# Patient Record
Sex: FEMALE | Race: WHITE | Employment: STUDENT | URBAN - METROPOLITAN AREA
[De-identification: names, ages, dates, MRNs, and addresses within clinical notes are randomized per-mention and may not be internally consistent; named-entity substitution may affect disease eponyms.]

---

## 2023-11-29 ENCOUNTER — HOSPITAL ENCOUNTER (INPATIENT)
Age: 63
LOS: 14 days | Discharge: NURSING, REHAB OR LONG-TERM CARE | DRG: 175 | End: 2023-12-13
Attending: EMERGENCY MEDICINE | Admitting: INTERNAL MEDICINE
Payer: MEDICARE

## 2023-11-29 ENCOUNTER — APPOINTMENT (OUTPATIENT)
Dept: GENERAL RADIOLOGY | Age: 63
DRG: 175 | End: 2023-11-29
Payer: MEDICARE

## 2023-11-29 DIAGNOSIS — J96.01 ACUTE RESPIRATORY FAILURE WITH HYPOXIA (HCC): ICD-10-CM

## 2023-11-29 DIAGNOSIS — J18.9 PNEUMONIA DUE TO INFECTIOUS ORGANISM, UNSPECIFIED LATERALITY, UNSPECIFIED PART OF LUNG: Primary | ICD-10-CM

## 2023-11-29 PROBLEM — J16.0 CAP (COMMUNITY ACQUIRED PNEUMONIA) DUE TO CHLAMYDIA SPECIES: Status: ACTIVE | Noted: 2023-11-29

## 2023-11-29 LAB
ALBUMIN SERPL-MCNC: 3.8 G/DL (ref 3.4–5)
ALBUMIN/GLOB SERPL: 1.2 {RATIO} (ref 1.1–2.2)
ALP SERPL-CCNC: 72 U/L (ref 40–129)
ALT SERPL-CCNC: 22 U/L (ref 10–40)
ANION GAP SERPL CALCULATED.3IONS-SCNC: 9 MMOL/L (ref 3–16)
AST SERPL-CCNC: 22 U/L (ref 15–37)
BACTERIA URNS QL MICRO: ABNORMAL /HPF
BASOPHILS # BLD: 0 K/UL (ref 0–0.2)
BASOPHILS NFR BLD: 0.4 %
BILIRUB SERPL-MCNC: <0.2 MG/DL (ref 0–1)
BILIRUB UR QL STRIP.AUTO: NEGATIVE
BUN SERPL-MCNC: 18 MG/DL (ref 7–20)
CALCIUM SERPL-MCNC: 9.4 MG/DL (ref 8.3–10.6)
CHLORIDE SERPL-SCNC: 99 MMOL/L (ref 99–110)
CLARITY UR: ABNORMAL
CO2 SERPL-SCNC: 31 MMOL/L (ref 21–32)
COLOR UR: YELLOW
CREAT SERPL-MCNC: 0.8 MG/DL (ref 0.6–1.2)
DEPRECATED RDW RBC AUTO: 15.5 % (ref 12.4–15.4)
EOSINOPHIL # BLD: 0.2 K/UL (ref 0–0.6)
EOSINOPHIL NFR BLD: 2.2 %
EPI CELLS #/AREA URNS AUTO: 4 /HPF (ref 0–5)
FLUAV RNA RESP QL NAA+PROBE: NOT DETECTED
FLUBV RNA RESP QL NAA+PROBE: NOT DETECTED
GFR SERPLBLD CREATININE-BSD FMLA CKD-EPI: >60 ML/MIN/{1.73_M2}
GLUCOSE SERPL-MCNC: 116 MG/DL (ref 70–99)
GLUCOSE UR STRIP.AUTO-MCNC: NEGATIVE MG/DL
HCT VFR BLD AUTO: 37.9 % (ref 36–48)
HGB BLD-MCNC: 12.3 G/DL (ref 12–16)
HGB UR QL STRIP.AUTO: NEGATIVE
HYALINE CASTS #/AREA URNS AUTO: 0 /LPF (ref 0–8)
KETONES UR STRIP.AUTO-MCNC: ABNORMAL MG/DL
LACTATE BLDV-SCNC: 1.9 MMOL/L (ref 0.4–1.9)
LEUKOCYTE ESTERASE UR QL STRIP.AUTO: ABNORMAL
LYMPHOCYTES # BLD: 1.5 K/UL (ref 1–5.1)
LYMPHOCYTES NFR BLD: 14.9 %
MCH RBC QN AUTO: 29.3 PG (ref 26–34)
MCHC RBC AUTO-ENTMCNC: 32.5 G/DL (ref 31–36)
MCV RBC AUTO: 90.3 FL (ref 80–100)
MONOCYTES # BLD: 1.2 K/UL (ref 0–1.3)
MONOCYTES NFR BLD: 11.8 %
NEUTROPHILS # BLD: 7 K/UL (ref 1.7–7.7)
NEUTROPHILS NFR BLD: 70.7 %
NITRITE UR QL STRIP.AUTO: NEGATIVE
NT-PROBNP SERPL-MCNC: 133 PG/ML (ref 0–124)
PH UR STRIP.AUTO: 7.5 [PH] (ref 5–8)
PLATELET # BLD AUTO: 368 K/UL (ref 135–450)
PMV BLD AUTO: 7.7 FL (ref 5–10.5)
POTASSIUM SERPL-SCNC: 3.9 MMOL/L (ref 3.5–5.1)
PROCALCITONIN SERPL IA-MCNC: 0.08 NG/ML (ref 0–0.15)
PROT SERPL-MCNC: 7 G/DL (ref 6.4–8.2)
PROT UR STRIP.AUTO-MCNC: NEGATIVE MG/DL
RBC # BLD AUTO: 4.2 M/UL (ref 4–5.2)
RBC CLUMPS #/AREA URNS AUTO: 3 /HPF (ref 0–4)
SARS-COV-2 RNA RESP QL NAA+PROBE: NOT DETECTED
SODIUM SERPL-SCNC: 139 MMOL/L (ref 136–145)
SP GR UR STRIP.AUTO: 1.02 (ref 1–1.03)
TROPONIN, HIGH SENSITIVITY: 10 NG/L (ref 0–14)
UA COMPLETE W REFLEX CULTURE PNL UR: ABNORMAL
UA DIPSTICK W REFLEX MICRO PNL UR: YES
URN SPEC COLLECT METH UR: ABNORMAL
UROBILINOGEN UR STRIP-ACNC: 1 E.U./DL
VALPROATE SERPL-MCNC: 44.2 UG/ML (ref 50–100)
WBC # BLD AUTO: 9.9 K/UL (ref 4–11)
WBC #/AREA URNS AUTO: 7 /HPF (ref 0–5)

## 2023-11-29 PROCEDURE — 6370000000 HC RX 637 (ALT 250 FOR IP)

## 2023-11-29 PROCEDURE — 80164 ASSAY DIPROPYLACETIC ACD TOT: CPT

## 2023-11-29 PROCEDURE — 84484 ASSAY OF TROPONIN QUANT: CPT

## 2023-11-29 PROCEDURE — 6360000002 HC RX W HCPCS: Performed by: EMERGENCY MEDICINE

## 2023-11-29 PROCEDURE — 2580000003 HC RX 258: Performed by: NURSE PRACTITIONER

## 2023-11-29 PROCEDURE — 96365 THER/PROPH/DIAG IV INF INIT: CPT

## 2023-11-29 PROCEDURE — 71045 X-RAY EXAM CHEST 1 VIEW: CPT

## 2023-11-29 PROCEDURE — 93005 ELECTROCARDIOGRAM TRACING: CPT | Performed by: EMERGENCY MEDICINE

## 2023-11-29 PROCEDURE — 81001 URINALYSIS AUTO W/SCOPE: CPT

## 2023-11-29 PROCEDURE — 87040 BLOOD CULTURE FOR BACTERIA: CPT

## 2023-11-29 PROCEDURE — 2580000003 HC RX 258: Performed by: EMERGENCY MEDICINE

## 2023-11-29 PROCEDURE — 2700000000 HC OXYGEN THERAPY PER DAY

## 2023-11-29 PROCEDURE — 96375 TX/PRO/DX INJ NEW DRUG ADDON: CPT

## 2023-11-29 PROCEDURE — 1200000000 HC SEMI PRIVATE

## 2023-11-29 PROCEDURE — 6370000000 HC RX 637 (ALT 250 FOR IP): Performed by: EMERGENCY MEDICINE

## 2023-11-29 PROCEDURE — 94640 AIRWAY INHALATION TREATMENT: CPT

## 2023-11-29 PROCEDURE — 83880 ASSAY OF NATRIURETIC PEPTIDE: CPT

## 2023-11-29 PROCEDURE — 84145 PROCALCITONIN (PCT): CPT

## 2023-11-29 PROCEDURE — 83605 ASSAY OF LACTIC ACID: CPT

## 2023-11-29 PROCEDURE — 6370000000 HC RX 637 (ALT 250 FOR IP): Performed by: NURSE PRACTITIONER

## 2023-11-29 PROCEDURE — 36415 COLL VENOUS BLD VENIPUNCTURE: CPT

## 2023-11-29 PROCEDURE — 87636 SARSCOV2 & INF A&B AMP PRB: CPT

## 2023-11-29 PROCEDURE — 85025 COMPLETE CBC W/AUTO DIFF WBC: CPT

## 2023-11-29 PROCEDURE — 99285 EMERGENCY DEPT VISIT HI MDM: CPT

## 2023-11-29 PROCEDURE — 80053 COMPREHEN METABOLIC PANEL: CPT

## 2023-11-29 RX ORDER — DOXYCYCLINE HYCLATE 100 MG
100 TABLET ORAL ONCE
Status: DISCONTINUED | OUTPATIENT
Start: 2023-11-29 | End: 2023-11-29

## 2023-11-29 RX ORDER — KETOROLAC TROMETHAMINE 15 MG/ML
15 INJECTION, SOLUTION INTRAMUSCULAR; INTRAVENOUS ONCE
Status: COMPLETED | OUTPATIENT
Start: 2023-11-29 | End: 2023-11-29

## 2023-11-29 RX ORDER — AZITHROMYCIN 250 MG/1
500 TABLET, FILM COATED ORAL EVERY 24 HOURS
Status: COMPLETED | OUTPATIENT
Start: 2023-11-30 | End: 2023-12-02

## 2023-11-29 RX ORDER — IPRATROPIUM BROMIDE AND ALBUTEROL SULFATE 2.5; .5 MG/3ML; MG/3ML
1 SOLUTION RESPIRATORY (INHALATION) ONCE
Status: COMPLETED | OUTPATIENT
Start: 2023-11-29 | End: 2023-11-29

## 2023-11-29 RX ORDER — OXYBUTYNIN CHLORIDE 5 MG/1
5 TABLET ORAL DAILY
Status: DISCONTINUED | OUTPATIENT
Start: 2023-11-29 | End: 2023-12-13 | Stop reason: HOSPADM

## 2023-11-29 RX ORDER — PRAVASTATIN SODIUM 40 MG
40 TABLET ORAL DAILY
Status: DISCONTINUED | OUTPATIENT
Start: 2023-11-29 | End: 2023-12-13 | Stop reason: HOSPADM

## 2023-11-29 RX ORDER — LEVOTHYROXINE SODIUM 88 UG/1
88 TABLET ORAL DAILY
Status: DISCONTINUED | OUTPATIENT
Start: 2023-11-29 | End: 2023-11-30

## 2023-11-29 RX ORDER — LAMOTRIGINE 100 MG/1
100 TABLET ORAL 2 TIMES DAILY
Status: DISCONTINUED | OUTPATIENT
Start: 2023-11-29 | End: 2023-12-13 | Stop reason: HOSPADM

## 2023-11-29 RX ORDER — DEXAMETHASONE SODIUM PHOSPHATE 4 MG/ML
8 INJECTION, SOLUTION INTRA-ARTICULAR; INTRALESIONAL; INTRAMUSCULAR; INTRAVENOUS; SOFT TISSUE ONCE
Status: COMPLETED | OUTPATIENT
Start: 2023-11-29 | End: 2023-11-29

## 2023-11-29 RX ORDER — SODIUM CHLORIDE 9 MG/ML
INJECTION, SOLUTION INTRAVENOUS PRN
Status: DISCONTINUED | OUTPATIENT
Start: 2023-11-29 | End: 2023-12-13 | Stop reason: HOSPADM

## 2023-11-29 RX ORDER — HYDROCHLOROTHIAZIDE 25 MG/1
25 TABLET ORAL DAILY
Status: ON HOLD | COMMUNITY
End: 2023-12-04 | Stop reason: HOSPADM

## 2023-11-29 RX ORDER — GUAIFENESIN 600 MG/1
600 TABLET, EXTENDED RELEASE ORAL 2 TIMES DAILY
Status: DISCONTINUED | OUTPATIENT
Start: 2023-11-29 | End: 2023-12-07

## 2023-11-29 RX ORDER — CEFDINIR 300 MG/1
300 CAPSULE ORAL ONCE
Status: DISCONTINUED | OUTPATIENT
Start: 2023-11-29 | End: 2023-11-29

## 2023-11-29 RX ORDER — DIVALPROEX SODIUM 250 MG/1
500 TABLET, DELAYED RELEASE ORAL 2 TIMES DAILY
Status: DISCONTINUED | OUTPATIENT
Start: 2023-11-29 | End: 2023-12-13 | Stop reason: HOSPADM

## 2023-11-29 RX ORDER — ONDANSETRON 2 MG/ML
4 INJECTION INTRAMUSCULAR; INTRAVENOUS EVERY 6 HOURS PRN
Status: DISCONTINUED | OUTPATIENT
Start: 2023-11-29 | End: 2023-12-13 | Stop reason: HOSPADM

## 2023-11-29 RX ORDER — IPRATROPIUM BROMIDE AND ALBUTEROL SULFATE 2.5; .5 MG/3ML; MG/3ML
SOLUTION RESPIRATORY (INHALATION)
Status: COMPLETED
Start: 2023-11-29 | End: 2023-11-29

## 2023-11-29 RX ORDER — ACETAMINOPHEN 325 MG/1
650 TABLET ORAL EVERY 6 HOURS PRN
Status: DISCONTINUED | OUTPATIENT
Start: 2023-11-29 | End: 2023-12-13 | Stop reason: HOSPADM

## 2023-11-29 RX ORDER — ALBUTEROL SULFATE 2.5 MG/3ML
2.5 SOLUTION RESPIRATORY (INHALATION)
Status: DISCONTINUED | OUTPATIENT
Start: 2023-11-29 | End: 2023-11-30

## 2023-11-29 RX ORDER — ACETAMINOPHEN 650 MG/1
650 SUPPOSITORY RECTAL EVERY 6 HOURS PRN
Status: DISCONTINUED | OUTPATIENT
Start: 2023-11-29 | End: 2023-12-13 | Stop reason: HOSPADM

## 2023-11-29 RX ORDER — ONDANSETRON 4 MG/1
4 TABLET, ORALLY DISINTEGRATING ORAL EVERY 8 HOURS PRN
Status: DISCONTINUED | OUTPATIENT
Start: 2023-11-29 | End: 2023-12-13 | Stop reason: HOSPADM

## 2023-11-29 RX ORDER — ACETAMINOPHEN 500 MG
1000 TABLET ORAL ONCE
Status: COMPLETED | OUTPATIENT
Start: 2023-11-29 | End: 2023-11-29

## 2023-11-29 RX ORDER — POLYETHYLENE GLYCOL 3350 17 G/17G
17 POWDER, FOR SOLUTION ORAL DAILY PRN
Status: DISCONTINUED | OUTPATIENT
Start: 2023-11-29 | End: 2023-12-13 | Stop reason: HOSPADM

## 2023-11-29 RX ORDER — DIVALPROEX SODIUM 250 MG/1
500 TABLET, DELAYED RELEASE ORAL NIGHTLY
Status: DISCONTINUED | OUTPATIENT
Start: 2023-11-29 | End: 2023-11-29

## 2023-11-29 RX ORDER — SODIUM CHLORIDE 0.9 % (FLUSH) 0.9 %
5-40 SYRINGE (ML) INJECTION PRN
Status: DISCONTINUED | OUTPATIENT
Start: 2023-11-29 | End: 2023-12-13 | Stop reason: HOSPADM

## 2023-11-29 RX ORDER — CALCIUM CARBONATE 500(1250)
500 TABLET ORAL DAILY
Status: DISCONTINUED | OUTPATIENT
Start: 2023-11-29 | End: 2023-12-13 | Stop reason: HOSPADM

## 2023-11-29 RX ORDER — LANOLIN ALCOHOL/MO/W.PET/CERES
3 CREAM (GRAM) TOPICAL NIGHTLY PRN
Status: DISCONTINUED | OUTPATIENT
Start: 2023-11-29 | End: 2023-12-13 | Stop reason: HOSPADM

## 2023-11-29 RX ORDER — SODIUM CHLORIDE, SODIUM LACTATE, POTASSIUM CHLORIDE, CALCIUM CHLORIDE 600; 310; 30; 20 MG/100ML; MG/100ML; MG/100ML; MG/100ML
INJECTION, SOLUTION INTRAVENOUS CONTINUOUS
Status: DISCONTINUED | OUTPATIENT
Start: 2023-11-29 | End: 2023-12-02

## 2023-11-29 RX ORDER — ENOXAPARIN SODIUM 100 MG/ML
40 INJECTION SUBCUTANEOUS DAILY
Status: DISCONTINUED | OUTPATIENT
Start: 2023-11-30 | End: 2023-12-05

## 2023-11-29 RX ORDER — SODIUM CHLORIDE 0.9 % (FLUSH) 0.9 %
5-40 SYRINGE (ML) INJECTION EVERY 12 HOURS SCHEDULED
Status: DISCONTINUED | OUTPATIENT
Start: 2023-11-29 | End: 2023-12-13 | Stop reason: HOSPADM

## 2023-11-29 RX ADMIN — DIVALPROEX SODIUM 500 MG: 250 TABLET, DELAYED RELEASE ORAL at 23:35

## 2023-11-29 RX ADMIN — IPRATROPIUM BROMIDE AND ALBUTEROL SULFATE 1 DOSE: .5; 3 SOLUTION RESPIRATORY (INHALATION) at 19:31

## 2023-11-29 RX ADMIN — LAMOTRIGINE 100 MG: 100 TABLET ORAL at 23:32

## 2023-11-29 RX ADMIN — CEFTRIAXONE SODIUM 2000 MG: 2 INJECTION, POWDER, FOR SOLUTION INTRAMUSCULAR; INTRAVENOUS at 20:13

## 2023-11-29 RX ADMIN — GUAIFENESIN 600 MG: 600 TABLET, EXTENDED RELEASE ORAL at 23:31

## 2023-11-29 RX ADMIN — DEXAMETHASONE SODIUM PHOSPHATE 8 MG: 4 INJECTION, SOLUTION INTRAMUSCULAR; INTRAVENOUS at 19:56

## 2023-11-29 RX ADMIN — ACETAMINOPHEN 1000 MG: 500 TABLET ORAL at 18:11

## 2023-11-29 RX ADMIN — SODIUM CHLORIDE, POTASSIUM CHLORIDE, SODIUM LACTATE AND CALCIUM CHLORIDE: 600; 310; 30; 20 INJECTION, SOLUTION INTRAVENOUS at 23:41

## 2023-11-29 RX ADMIN — KETOROLAC TROMETHAMINE 15 MG: 15 INJECTION, SOLUTION INTRAMUSCULAR; INTRAVENOUS at 18:54

## 2023-11-29 RX ADMIN — AZITHROMYCIN MONOHYDRATE 500 MG: 500 INJECTION, POWDER, LYOPHILIZED, FOR SOLUTION INTRAVENOUS at 20:52

## 2023-11-29 RX ADMIN — Medication 10 ML: at 23:41

## 2023-11-29 RX ADMIN — IPRATROPIUM BROMIDE AND ALBUTEROL SULFATE 1 DOSE: 2.5; .5 SOLUTION RESPIRATORY (INHALATION) at 19:31

## 2023-11-29 ASSESSMENT — PAIN DESCRIPTION - PAIN TYPE: TYPE: ACUTE PAIN

## 2023-11-29 ASSESSMENT — PAIN - FUNCTIONAL ASSESSMENT: PAIN_FUNCTIONAL_ASSESSMENT: ACTIVITIES ARE NOT PREVENTED

## 2023-11-29 ASSESSMENT — PAIN DESCRIPTION - ORIENTATION: ORIENTATION: LEFT

## 2023-11-29 ASSESSMENT — PAIN SCALES - GENERAL
PAINLEVEL_OUTOF10: 8
PAINLEVEL_OUTOF10: 0
PAINLEVEL_OUTOF10: 5

## 2023-11-29 ASSESSMENT — PAIN DESCRIPTION - DESCRIPTORS: DESCRIPTORS: CRUSHING

## 2023-11-29 ASSESSMENT — PAIN DESCRIPTION - LOCATION: LOCATION: ARM

## 2023-11-29 NOTE — ED NOTES
Spoke with Jose Bahena at [facility]. States pt's symptoms began Sunday and have worsened. Complained to staff she could not breath through her throat. Pt has been taking dayquil.      Jose Bahena will arrange  if needed     Arely Cyr RN  11/29/23 4188

## 2023-11-30 LAB
ALBUMIN SERPL-MCNC: 3.5 G/DL (ref 3.4–5)
ALBUMIN/GLOB SERPL: 1.1 {RATIO} (ref 1.1–2.2)
ALP SERPL-CCNC: 64 U/L (ref 40–129)
ALT SERPL-CCNC: 17 U/L (ref 10–40)
ANION GAP SERPL CALCULATED.3IONS-SCNC: 10 MMOL/L (ref 3–16)
AST SERPL-CCNC: 18 U/L (ref 15–37)
BASOPHILS # BLD: 0 K/UL (ref 0–0.2)
BASOPHILS NFR BLD: 0.5 %
BILIRUB SERPL-MCNC: 0.3 MG/DL (ref 0–1)
BUN SERPL-MCNC: 14 MG/DL (ref 7–20)
CALCIUM SERPL-MCNC: 9.1 MG/DL (ref 8.3–10.6)
CHLORIDE SERPL-SCNC: 100 MMOL/L (ref 99–110)
CO2 SERPL-SCNC: 28 MMOL/L (ref 21–32)
CREAT SERPL-MCNC: 0.6 MG/DL (ref 0.6–1.2)
DEPRECATED RDW RBC AUTO: 15.3 % (ref 12.4–15.4)
EKG ATRIAL RATE: 78 BPM
EKG DIAGNOSIS: NORMAL
EKG P AXIS: 53 DEGREES
EKG P-R INTERVAL: 162 MS
EKG Q-T INTERVAL: 388 MS
EKG QRS DURATION: 86 MS
EKG QTC CALCULATION (BAZETT): 442 MS
EKG R AXIS: -16 DEGREES
EKG T AXIS: 22 DEGREES
EKG VENTRICULAR RATE: 78 BPM
EOSINOPHIL # BLD: 0 K/UL (ref 0–0.6)
EOSINOPHIL NFR BLD: 0.1 %
GFR SERPLBLD CREATININE-BSD FMLA CKD-EPI: >60 ML/MIN/{1.73_M2}
GLUCOSE SERPL-MCNC: 136 MG/DL (ref 70–99)
HCT VFR BLD AUTO: 39.1 % (ref 36–48)
HGB BLD-MCNC: 12.7 G/DL (ref 12–16)
LYMPHOCYTES # BLD: 1.2 K/UL (ref 1–5.1)
LYMPHOCYTES NFR BLD: 12 %
MCH RBC QN AUTO: 29.5 PG (ref 26–34)
MCHC RBC AUTO-ENTMCNC: 32.4 G/DL (ref 31–36)
MCV RBC AUTO: 90.9 FL (ref 80–100)
MONOCYTES # BLD: 0.4 K/UL (ref 0–1.3)
MONOCYTES NFR BLD: 4.3 %
NEUTROPHILS # BLD: 8.1 K/UL (ref 1.7–7.7)
NEUTROPHILS NFR BLD: 83.1 %
PLATELET # BLD AUTO: 391 K/UL (ref 135–450)
PMV BLD AUTO: 7.9 FL (ref 5–10.5)
POTASSIUM SERPL-SCNC: 4.2 MMOL/L (ref 3.5–5.1)
PROT SERPL-MCNC: 6.7 G/DL (ref 6.4–8.2)
RBC # BLD AUTO: 4.3 M/UL (ref 4–5.2)
REPORT: NORMAL
RESP PATH DNA+RNA PNL NPH NAA+NON-PROBE: NORMAL
SODIUM SERPL-SCNC: 138 MMOL/L (ref 136–145)
WBC # BLD AUTO: 9.8 K/UL (ref 4–11)

## 2023-11-30 PROCEDURE — 85025 COMPLETE CBC W/AUTO DIFF WBC: CPT

## 2023-11-30 PROCEDURE — 87449 NOS EACH ORGANISM AG IA: CPT

## 2023-11-30 PROCEDURE — 6370000000 HC RX 637 (ALT 250 FOR IP): Performed by: HOSPITALIST

## 2023-11-30 PROCEDURE — 6370000000 HC RX 637 (ALT 250 FOR IP): Performed by: NURSE PRACTITIONER

## 2023-11-30 PROCEDURE — 80053 COMPREHEN METABOLIC PANEL: CPT

## 2023-11-30 PROCEDURE — 2580000003 HC RX 258: Performed by: NURSE PRACTITIONER

## 2023-11-30 PROCEDURE — 6360000002 HC RX W HCPCS: Performed by: NURSE PRACTITIONER

## 2023-11-30 PROCEDURE — 6360000002 HC RX W HCPCS: Performed by: HOSPITALIST

## 2023-11-30 PROCEDURE — 2700000000 HC OXYGEN THERAPY PER DAY

## 2023-11-30 PROCEDURE — 94761 N-INVAS EAR/PLS OXIMETRY MLT: CPT

## 2023-11-30 PROCEDURE — 94640 AIRWAY INHALATION TREATMENT: CPT

## 2023-11-30 PROCEDURE — 0202U NFCT DS 22 TRGT SARS-COV-2: CPT

## 2023-11-30 PROCEDURE — 1200000000 HC SEMI PRIVATE

## 2023-11-30 PROCEDURE — 93010 ELECTROCARDIOGRAM REPORT: CPT | Performed by: INTERNAL MEDICINE

## 2023-11-30 PROCEDURE — 36415 COLL VENOUS BLD VENIPUNCTURE: CPT

## 2023-11-30 RX ORDER — ALBUTEROL SULFATE 2.5 MG/3ML
2.5 SOLUTION RESPIRATORY (INHALATION)
Status: DISCONTINUED | OUTPATIENT
Start: 2023-11-30 | End: 2023-11-30

## 2023-11-30 RX ORDER — ALBUTEROL SULFATE 2.5 MG/3ML
2.5 SOLUTION RESPIRATORY (INHALATION) EVERY 4 HOURS PRN
Status: DISCONTINUED | OUTPATIENT
Start: 2023-11-30 | End: 2023-12-05

## 2023-11-30 RX ORDER — LEVOTHYROXINE SODIUM 88 UG/1
88 TABLET ORAL DAILY
Status: DISCONTINUED | OUTPATIENT
Start: 2023-11-30 | End: 2023-12-13 | Stop reason: HOSPADM

## 2023-11-30 RX ORDER — ALBUTEROL SULFATE 2.5 MG/3ML
2.5 SOLUTION RESPIRATORY (INHALATION)
Status: DISCONTINUED | OUTPATIENT
Start: 2023-11-30 | End: 2023-12-04

## 2023-11-30 RX ADMIN — DIVALPROEX SODIUM 500 MG: 250 TABLET, DELAYED RELEASE ORAL at 10:21

## 2023-11-30 RX ADMIN — CALCIUM 500 MG: 500 TABLET ORAL at 10:15

## 2023-11-30 RX ADMIN — LEVOTHYROXINE SODIUM 88 MCG: 0.09 TABLET ORAL at 10:00

## 2023-11-30 RX ADMIN — ALBUTEROL SULFATE 2.5 MG: 2.5 SOLUTION RESPIRATORY (INHALATION) at 22:17

## 2023-11-30 RX ADMIN — PRAVASTATIN SODIUM 40 MG: 40 TABLET ORAL at 10:18

## 2023-11-30 RX ADMIN — ALBUTEROL SULFATE 2.5 MG: 2.5 SOLUTION RESPIRATORY (INHALATION) at 09:44

## 2023-11-30 RX ADMIN — AZITHROMYCIN DIHYDRATE 500 MG: 250 TABLET, FILM COATED ORAL at 20:17

## 2023-11-30 RX ADMIN — ENOXAPARIN SODIUM 40 MG: 100 INJECTION SUBCUTANEOUS at 08:22

## 2023-11-30 RX ADMIN — GUAIFENESIN 600 MG: 600 TABLET, EXTENDED RELEASE ORAL at 20:17

## 2023-11-30 RX ADMIN — CEFTRIAXONE SODIUM 1000 MG: 1 INJECTION, POWDER, FOR SOLUTION INTRAMUSCULAR; INTRAVENOUS at 20:20

## 2023-11-30 RX ADMIN — LAMOTRIGINE 100 MG: 100 TABLET ORAL at 10:12

## 2023-11-30 RX ADMIN — GUAIFENESIN 600 MG: 600 TABLET, EXTENDED RELEASE ORAL at 08:22

## 2023-11-30 RX ADMIN — DIVALPROEX SODIUM 500 MG: 250 TABLET, DELAYED RELEASE ORAL at 20:17

## 2023-11-30 RX ADMIN — OXYBUTYNIN CHLORIDE 5 MG: 5 TABLET ORAL at 10:19

## 2023-11-30 RX ADMIN — LAMOTRIGINE 100 MG: 100 TABLET ORAL at 20:17

## 2023-11-30 ASSESSMENT — PAIN SCALES - GENERAL: PAINLEVEL_OUTOF10: 0

## 2023-11-30 NOTE — PROGRESS NOTES
Admission assessment completed, pts alert and oriented, pts is alert but confused, forgetful, unable to make her own decision, vitals are stable at this time, afebrile, peripheral Iv on her right forearm, auscultate lungs noted wheezes and crackles anteriorly and diminished lungs sounds on the bases, oxygen sat on 2L nasal canula, all her medications are administered as per [facility], incontinent care completed, left patient lying in her bed, safety measures completed, bed in lowest position, call light in reach, bed locked, alarm on the bed.   Slime [facility] RN

## 2023-11-30 NOTE — ED NOTES
ED TO INPATIENT SBAR HANDOFF    Patient Name: Marlene Garrison   :  1960  66 y.o. MRN:  [id number]  Preferred Name    ED Room #:  ED-0007/07  Family/Caregiver Present no   Restraints no   Sitter no   Sepsis Risk Score Sepsis Risk Score: 2.35    Situation  Code Status: No Order . Allergies: Patient has no known allergies. Weight: Patient Vitals for the past 96 hrs (Last 3 readings):   Weight   23 1833 92.1 kg (203 lb)     Arrived from: nursing home  Chief Complaint:   Chief Complaint   Patient presents with    Cough     PT to ED via [address] [facility] from home c/o cough. PT is MRDD. History obtained with EMS/Caregiver help. Pt temp 101.2. PT has been getting nyquil and dayquil for symptoms. Symptoms began x6 days. Fever    POPLAR BLUFF [facility] Problem/Diagnosis:  Principal Problem:    CAP (community acquired pneumonia) due to Chlamydia species  Resolved Problems:    * No resolved hospital problems. *    Imaging:   XR CHEST PORTABLE   Final Result   Mild cardiomegaly and mild central pulmonary congestion      Overlying EKG lead artifact with a questionable early infiltrate vs atypical   pulmonary edema along the left perihilar region and extending into the lung   base.            Abnormal labs:   Abnormal Labs Reviewed   CBC WITH AUTO DIFFERENTIAL - Abnormal; Notable for the following components:       Result Value    RDW 15.5 (*)     All other components within normal limits   COMPREHENSIVE METABOLIC PANEL W/ REFLEX TO MG FOR LOW K - Abnormal; Notable for the following components:    Glucose 116 (*)     All other components within normal limits   URINALYSIS WITH REFLEX TO CULTURE - Abnormal; Notable for the following components:    Clarity, UA CLOUDY (*)     Ketones, Urine TRACE (*)     Leukocyte Esterase, Urine SMALL (*)     All other components within normal limits   VALPROIC ACID LEVEL, TOTAL - Abnormal; Notable for the following components:    Valproic Acid Lvl 44.2 (*)     All other components signed by Milena Vila RN on 11/29/2023 at 9:26 PM       Milena Vila, [address]  11/29/23 1455

## 2023-11-30 NOTE — PROGRESS NOTES
Pharmacy Home Medication Reconciliation Note    A medication reconciliation has been completed for Daisy Gibson 1960    Pharmacy: [facility] [address] [facility]. [address] [place] [address]    Information provided by: Patient    The patient's home medication list is as follows: No current facility-administered medications on file prior to encounter. Current Outpatient Medications on File Prior to Encounter   Medication Sig Dispense Refill    hydroCHLOROthiazide (HYDRODIURIL) 25 MG tablet Take 1 tablet by mouth daily      Multiple Vitamins-Minerals (CERTAVITE/ANTIOXIDANTS PO) Take by mouth      divalproex (DEPAKOTE) 500 MG DR tablet Take 1 tablet by mouth daily      divalproex (DEPAKOTE) 250 MG DR tablet Take 1 tablet by mouth daily      lamoTRIgine (LAMICTAL) 100 MG tablet Take 1 tablet by mouth 2 times daily      levothyroxine (SYNTHROID) 100 MCG tablet Take 1 tablet by mouth Daily      oxybutynin (DITROPAN) 5 MG tablet Take 1 tablet by mouth daily      calcium carbonate (OSCAL) 500 MG TABS tablet Take 1 tablet by mouth daily      pravastatin (PRAVACHOL) 40 MG tablet Take 1 tablet by mouth daily       Of note, Patient takes Divalproex 250mg DR tab and Divalproex 500mg DR tab once daily for a total dose of 750mg; Patient states most recent dose was the evening of 11/28    Of note, Patient takes Lamictal 100mg tab BID; Patient states most recent dose was evening of 11/28    Timing of last doses updated.     Thank you,  Nevaeh Lebron CPhT

## 2023-11-30 NOTE — PROGRESS NOTES
ADVANCED CARE PLANNING    Name:Daisy Gibson       :  1960              MRN:  [id number]      Purpose of Encounter: Advanced care planning in light of problem listed above   Parties in attendance: :Karina Peterson MD, Family members:  Decisional Capacity:Yes    Diagnosis: Principal Problem:    CAP (community acquired pneumonia) due to Chlamydia species  Resolved Problems:    * No resolved hospital problems. *    Patients Medical Story:Presented with worsening symptom of dx above. With at risk for life threatening event. Procedure and testing as noted in progress noted. We discussed patient long term goal and also wishes and aggressive care. Discussed in detail about code status and what it means with detailed explanation. Goals of Care Determinations: Patient wishes to focus on full code with aggressive care, CPR, intubation long term vent and facility as well. Plan: Will notify Dania Conte MD of change in care plan. Will look at further interventions as needed. Code Status: At this time patient wishes to be Full Code  Time Spent with Patient: 21 minutes      Electronically signed by Katty Telles MD on 2023 at 5:55 PM  Thank you Dania Conte MD for the opportunity to be involved in this patient's care.

## 2023-11-30 NOTE — ED NOTES
Pt transported to [facility] in stable condition with infusions running per mar on bedside monitor. During transport IV site infiltrated that was previous removed.       Jenna Coleman RN  11/29/23 1314

## 2023-11-30 NOTE — H&P
V2.0  History and Physical      Name:  Jola Cooks /Age/Sex: 1960  (66 y.o. female)   MRN & CSN:  [id number] & [id number] Encounter Date/Time: 2023 9:15 PM EST   Location:  ED-000 PCP: Rowan Thomas MD       Hospital Day: 1    Assessment and Plan:   Jola Cooks is a 66 y.o. female who presents with CAP (community acquired pneumonia) due to [address] Problems             Last Modified POA    * (Principal) CAP (community acquired pneumonia) due to Chlamydia species 2023 Yes       Plan:  Community Acquired Pneumonia with Hypoxia  Admit inpatient with telemetry  Respiratory panel  Blood cx x2  COVID and flu negative  Initially she was going to be discharged home however her oxygen levels dropped in ER and now on 4L,   IV antibiotics     2. Hypertension  BP elevated in ER but trending down, restart home meds    3. Seizures  Continue home Depakote    4. Eagle-Gutierrez Syndrome     Disposition:   Current Living situation: home  Expected Disposition: home  Estimated D/C: 3    Diet No diet orders on file   DVT Prophylaxis [x] Lovenox, []  Heparin, [] SCDs, [] Ambulation,  [] Eliquis, [] Xarelto, [] Coumadin   Code Status No Order   Surrogate Decision Maker/ POA      Personally reviewed Lab Studies and Imaging     Discussed management of the case with Fernie Vines MD in ER who recommended admission     History from:     patient    History of Present Illness:     Chief Complaint: cough, shortness of breath   Jola Cooks is a 66 y.o. female with pmh of Eagle-Gutierrez Syndrome, HLD, HTN, Seizures who presents with cough and shortness of breath. Patient reports she has been ill since [place] 6 days ago. She has had cough, fever, decreased appetite, and shortness of breath. Her sats dropped  while in ER and now is requiring 4L NC  94%. Temp 101.2  She lives in an apartment by herself but has a caregiver.      Review of Systems:        Pertinent positives and negatives Labs     11/29/23  1752   AST 22   ALT 22   BILITOT <0.2   ALKPHOS 72     Lipids: No results found for: \"CHOL\", \"HDL\", \"TRIG\"  Hemoglobin A1C: No results found for: \"LABA1C\"  TSH: No results found for: \"TSH\"  Troponin: No results found for: \"TROPONINT\"  Lactic Acid: No results for input(s): \"LACTA\" in the last 72 hours. BNP:   Recent Labs     11/29/23  1752   PROBNP 133*     UA:  Lab Results   Component Value Date/Time    NITRU Negative 11/29/2023 05:52 PM    COLORU Yellow 11/29/2023 05:52 PM    PHUR 7.5 11/29/2023 05:52 PM    WBCUA 7 11/29/2023 05:52 PM    RBCUA 3 11/29/2023 05:52 PM    BACTERIA 2+ 11/29/2023 05:52 PM    CLARITYU CLOUDY 11/29/2023 05:52 PM    SPECGRAV 1.025 11/29/2023 05:52 PM    LEUKOCYTESUR SMALL 11/29/2023 05:52 PM    UROBILINOGEN 1.0 11/29/2023 05:52 PM    BILIRUBINUR Negative 11/29/2023 05:52 PM    BLOODU Negative 11/29/2023 05:52 PM    GLUCOSEU Negative 11/29/2023 05:52 PM    KETUA TRACE 11/29/2023 05:52 PM     Urine Cultures: No results found for: \"LABURIN\"  Blood Cultures: No results found for: \"BC\"  No results found for: \"BLOODCULT2\"  Organism: No results found for: \"ORG\"    Imaging/Diagnostics Last 24 Hours   XR CHEST PORTABLE    Result Date: 11/29/2023  EXAMINATION: ONE XRAY VIEW OF THE CHEST 11/29/2023 6:32 pm COMPARISON: None. HISTORY: ORDERING SYSTEM PROVIDED HISTORY: cough TECHNOLOGIST PROVIDED HISTORY: Reason for exam:->cough Reason for Exam: cough FINDINGS: There is overlying EKG lead artifact. The heart is borderline enlarged. The pulmonary vessels are slightly engorged centrally. There is hazy interstitial and ground-glass opacity along the left perihilar region extending inferiorly. No effusion is seen. Mild cardiomegaly and mild central pulmonary congestion Overlying EKG lead artifact with a questionable early infiltrate vs atypical pulmonary edema along the left perihilar region and extending into the lung base.          Electronically signed by KATHERINE Levine

## 2023-11-30 NOTE — PLAN OF CARE
Problem: Discharge Planning  Goal: Discharge to home or other facility with appropriate resources  11/30/2023 0820 by Bjorn Burkett RN  Outcome: Progressing     Problem: Pain  Goal: Verbalizes/displays adequate comfort level or baseline comfort level  11/30/2023 0820 by Bjorn Burkett RN  Outcome: Progressing     Problem: Skin/Tissue Integrity  Goal: Absence of new skin breakdown  Description: 1. Monitor for areas of redness and/or skin breakdown  2. Assess vascular access sites hourly  3. Every 4-6 hours minimum:  Change oxygen saturation probe site  4. Every 4-6 hours:  If on nasal continuous positive airway pressure, respiratory therapy assess nares and determine need for appliance change or resting period.   11/30/2023 0820 by Bjorn Burkett RN  Outcome: Progressing     Problem: Safety - Adult  Goal: Free from fall injury  11/30/2023 0820 by Bjorn Burkett RN  Outcome: Progressing     Problem: ABCDS Injury Assessment  Goal: Absence of physical injury  11/30/2023 0820 by Bjorn Burkett RN  Outcome: Progressing

## 2023-11-30 NOTE — ED PROVIDER NOTES
EMERGENCY DEPARTMENT PROVIDER NOTE    Patient Identification  Pt Name: [facility]  MRN: [id number]  [address] [facility] 1960  Date of evaluation: 11/29/2023  Provider: Troy Whitfield DO  PCP: Guero Zavala MD    Chief Complaint  Cough (PT to ED via [address] [facility] from home c/o cough. PT is MRDD. History obtained with EMS/Caregiver help. Pt temp 101.2. PT has been getting nyquil and dayquil for symptoms. Symptoms began x6 days. ), Fever, and Malaise      HPI  (History provided by patient and EMS)  This is a 66 y.o. female with pertinent past medical history of developmental disability, seizure disorder who was brought in by EMS transportation for cough ongoing for the past 6 days. Patient is nursing home resident, has been receiving NyQuil and DayQuil however today developed fever. Patient also reports sore throat and difficulty breathing. I have reviewed the following nursing documentation:  Allergies: Patient has no known allergies.     Past medical history:   Past Medical History:   Diagnosis Date    Developmental disability     Seizure [facility])      Past surgical history:   Past Surgical History:   Procedure Laterality Date    THYROID SURGERY  1986    removed       Home medications:   Previous Medications    CALCIUM CARBONATE (OSCAL) 500 MG TABS TABLET    Take 1 tablet by mouth daily    DIVALPROEX (DEPAKOTE) 250 MG DR TABLET    Take 1 tablet by mouth daily    DIVALPROEX (DEPAKOTE) 500 MG DR TABLET    Take 1 tablet by mouth daily    HYDROCHLOROTHIAZIDE (HYDRODIURIL) 25 MG TABLET    Take 1 tablet by mouth daily    LAMOTRIGINE (LAMICTAL) 100 MG TABLET    Take 1 tablet by mouth 2 times daily    LEVOTHYROXINE (SYNTHROID) 100 MCG TABLET    Take 1 tablet by mouth Daily    MULTIPLE VITAMINS-MINERALS (CERTAVITE/ANTIOXIDANTS PO)    Take by mouth    OXYBUTYNIN (DITROPAN) 5 MG TABLET    Take 1 tablet by mouth daily    PRAVASTATIN (PRAVACHOL) 40 MG TABLET    Take 1 tablet by mouth daily       Social history:  reports DO  11/29/23 2344       Gerardo JONES, DO  11/29/23 2342

## 2023-11-30 NOTE — PROGRESS NOTES
11/30/23 0149   RT Protocol   History Pulmonary Disease 0   Respiratory pattern 2   Breath sounds 2   Cough 1   Indications for Bronchodilator Therapy Decreased or absent breath sounds   Bronchodilator Assessment Score 5

## 2023-12-01 LAB
ANION GAP SERPL CALCULATED.3IONS-SCNC: 6 MMOL/L (ref 3–16)
BUN SERPL-MCNC: 11 MG/DL (ref 7–20)
CALCIUM SERPL-MCNC: 9.3 MG/DL (ref 8.3–10.6)
CHLORIDE SERPL-SCNC: 100 MMOL/L (ref 99–110)
CO2 SERPL-SCNC: 34 MMOL/L (ref 21–32)
CREAT SERPL-MCNC: 0.8 MG/DL (ref 0.6–1.2)
DEPRECATED RDW RBC AUTO: 15.7 % (ref 12.4–15.4)
GFR SERPLBLD CREATININE-BSD FMLA CKD-EPI: >60 ML/MIN/{1.73_M2}
GLUCOSE SERPL-MCNC: 82 MG/DL (ref 70–99)
HCT VFR BLD AUTO: 37.8 % (ref 36–48)
HGB BLD-MCNC: 12.5 G/DL (ref 12–16)
LEGIONELLA AG UR QL: NORMAL
MCH RBC QN AUTO: 30.6 PG (ref 26–34)
MCHC RBC AUTO-ENTMCNC: 33.1 G/DL (ref 31–36)
MCV RBC AUTO: 92.6 FL (ref 80–100)
PLATELET # BLD AUTO: 376 K/UL (ref 135–450)
PMV BLD AUTO: 7.6 FL (ref 5–10.5)
POTASSIUM SERPL-SCNC: 5.2 MMOL/L (ref 3.5–5.1)
RBC # BLD AUTO: 4.08 M/UL (ref 4–5.2)
S PNEUM AG UR QL: NORMAL
SODIUM SERPL-SCNC: 140 MMOL/L (ref 136–145)
WBC # BLD AUTO: 8.1 K/UL (ref 4–11)

## 2023-12-01 PROCEDURE — 94640 AIRWAY INHALATION TREATMENT: CPT

## 2023-12-01 PROCEDURE — 6370000000 HC RX 637 (ALT 250 FOR IP): Performed by: HOSPITALIST

## 2023-12-01 PROCEDURE — 2700000000 HC OXYGEN THERAPY PER DAY

## 2023-12-01 PROCEDURE — 6370000000 HC RX 637 (ALT 250 FOR IP): Performed by: NURSE PRACTITIONER

## 2023-12-01 PROCEDURE — 80048 BASIC METABOLIC PNL TOTAL CA: CPT

## 2023-12-01 PROCEDURE — 36415 COLL VENOUS BLD VENIPUNCTURE: CPT

## 2023-12-01 PROCEDURE — 1200000000 HC SEMI PRIVATE

## 2023-12-01 PROCEDURE — 2580000003 HC RX 258: Performed by: NURSE PRACTITIONER

## 2023-12-01 PROCEDURE — 85027 COMPLETE CBC AUTOMATED: CPT

## 2023-12-01 PROCEDURE — 6360000002 HC RX W HCPCS: Performed by: HOSPITALIST

## 2023-12-01 PROCEDURE — 6360000002 HC RX W HCPCS: Performed by: NURSE PRACTITIONER

## 2023-12-01 PROCEDURE — 94761 N-INVAS EAR/PLS OXIMETRY MLT: CPT

## 2023-12-01 RX ORDER — BENZONATATE 100 MG/1
200 CAPSULE ORAL 3 TIMES DAILY PRN
Status: DISPENSED | OUTPATIENT
Start: 2023-12-01 | End: 2023-12-06

## 2023-12-01 RX ORDER — CODEINE PHOSPHATE AND GUAIFENESIN 10; 100 MG/5ML; MG/5ML
5 SOLUTION ORAL EVERY 4 HOURS PRN
Status: DISCONTINUED | OUTPATIENT
Start: 2023-12-01 | End: 2023-12-07

## 2023-12-01 RX ADMIN — ENOXAPARIN SODIUM 40 MG: 100 INJECTION SUBCUTANEOUS at 09:59

## 2023-12-01 RX ADMIN — LEVOTHYROXINE SODIUM 88 MCG: 0.09 TABLET ORAL at 05:12

## 2023-12-01 RX ADMIN — GUAIFENESIN 600 MG: 600 TABLET, EXTENDED RELEASE ORAL at 20:10

## 2023-12-01 RX ADMIN — LAMOTRIGINE 100 MG: 100 TABLET ORAL at 20:11

## 2023-12-01 RX ADMIN — SODIUM CHLORIDE, POTASSIUM CHLORIDE, SODIUM LACTATE AND CALCIUM CHLORIDE: 600; 310; 30; 20 INJECTION, SOLUTION INTRAVENOUS at 15:25

## 2023-12-01 RX ADMIN — GUAIFENESIN 600 MG: 600 TABLET, EXTENDED RELEASE ORAL at 09:58

## 2023-12-01 RX ADMIN — LAMOTRIGINE 100 MG: 100 TABLET ORAL at 09:58

## 2023-12-01 RX ADMIN — CEFTRIAXONE SODIUM 1000 MG: 1 INJECTION, POWDER, FOR SOLUTION INTRAMUSCULAR; INTRAVENOUS at 20:04

## 2023-12-01 RX ADMIN — SODIUM CHLORIDE, POTASSIUM CHLORIDE, SODIUM LACTATE AND CALCIUM CHLORIDE: 600; 310; 30; 20 INJECTION, SOLUTION INTRAVENOUS at 04:48

## 2023-12-01 RX ADMIN — GUAIFENESIN AND CODEINE PHOSPHATE 5 ML: 100; 10 SOLUTION ORAL at 17:42

## 2023-12-01 RX ADMIN — PRAVASTATIN SODIUM 40 MG: 40 TABLET ORAL at 20:11

## 2023-12-01 RX ADMIN — DIVALPROEX SODIUM 500 MG: 250 TABLET, DELAYED RELEASE ORAL at 20:10

## 2023-12-01 RX ADMIN — BENZONATATE 200 MG: 100 CAPSULE ORAL at 04:46

## 2023-12-01 RX ADMIN — OXYBUTYNIN CHLORIDE 5 MG: 5 TABLET ORAL at 09:58

## 2023-12-01 RX ADMIN — ALBUTEROL SULFATE 2.5 MG: 2.5 SOLUTION RESPIRATORY (INHALATION) at 12:54

## 2023-12-01 RX ADMIN — CALCIUM 500 MG: 500 TABLET ORAL at 09:58

## 2023-12-01 RX ADMIN — DIVALPROEX SODIUM 500 MG: 250 TABLET, DELAYED RELEASE ORAL at 09:58

## 2023-12-01 RX ADMIN — BENZONATATE 200 MG: 100 CAPSULE ORAL at 17:42

## 2023-12-01 RX ADMIN — AZITHROMYCIN DIHYDRATE 500 MG: 250 TABLET, FILM COATED ORAL at 20:02

## 2023-12-01 ASSESSMENT — PAIN DESCRIPTION - LOCATION: LOCATION: THROAT

## 2023-12-01 ASSESSMENT — PAIN SCALES - GENERAL: PAINLEVEL_OUTOF10: 5

## 2023-12-02 ENCOUNTER — APPOINTMENT (OUTPATIENT)
Dept: GENERAL RADIOLOGY | Age: 63
DRG: 175 | End: 2023-12-02
Payer: MEDICARE

## 2023-12-02 PROCEDURE — 6360000002 HC RX W HCPCS: Performed by: HOSPITALIST

## 2023-12-02 PROCEDURE — 6360000002 HC RX W HCPCS: Performed by: NURSE PRACTITIONER

## 2023-12-02 PROCEDURE — 2580000003 HC RX 258: Performed by: NURSE PRACTITIONER

## 2023-12-02 PROCEDURE — 6370000000 HC RX 637 (ALT 250 FOR IP): Performed by: HOSPITALIST

## 2023-12-02 PROCEDURE — 94761 N-INVAS EAR/PLS OXIMETRY MLT: CPT

## 2023-12-02 PROCEDURE — 2700000000 HC OXYGEN THERAPY PER DAY

## 2023-12-02 PROCEDURE — 2580000003 HC RX 258: Performed by: HOSPITALIST

## 2023-12-02 PROCEDURE — 94640 AIRWAY INHALATION TREATMENT: CPT

## 2023-12-02 PROCEDURE — 6370000000 HC RX 637 (ALT 250 FOR IP): Performed by: NURSE PRACTITIONER

## 2023-12-02 PROCEDURE — 1200000000 HC SEMI PRIVATE

## 2023-12-02 PROCEDURE — 71045 X-RAY EXAM CHEST 1 VIEW: CPT

## 2023-12-02 RX ORDER — FUROSEMIDE 10 MG/ML
40 INJECTION INTRAMUSCULAR; INTRAVENOUS ONCE
Status: COMPLETED | OUTPATIENT
Start: 2023-12-02 | End: 2023-12-02

## 2023-12-02 RX ADMIN — BENZONATATE 200 MG: 100 CAPSULE ORAL at 05:52

## 2023-12-02 RX ADMIN — ENOXAPARIN SODIUM 40 MG: 100 INJECTION SUBCUTANEOUS at 08:16

## 2023-12-02 RX ADMIN — BENZONATATE 200 MG: 100 CAPSULE ORAL at 20:01

## 2023-12-02 RX ADMIN — LAMOTRIGINE 100 MG: 100 TABLET ORAL at 20:02

## 2023-12-02 RX ADMIN — BENZONATATE 200 MG: 100 CAPSULE ORAL at 10:52

## 2023-12-02 RX ADMIN — GUAIFENESIN 600 MG: 600 TABLET, EXTENDED RELEASE ORAL at 20:01

## 2023-12-02 RX ADMIN — SODIUM CHLORIDE, POTASSIUM CHLORIDE, SODIUM LACTATE AND CALCIUM CHLORIDE: 600; 310; 30; 20 INJECTION, SOLUTION INTRAVENOUS at 02:04

## 2023-12-02 RX ADMIN — LAMOTRIGINE 100 MG: 100 TABLET ORAL at 08:09

## 2023-12-02 RX ADMIN — GUAIFENESIN AND CODEINE PHOSPHATE 5 ML: 100; 10 SOLUTION ORAL at 08:08

## 2023-12-02 RX ADMIN — Medication 10 ML: at 20:08

## 2023-12-02 RX ADMIN — ALBUTEROL SULFATE 2.5 MG: 2.5 SOLUTION RESPIRATORY (INHALATION) at 20:26

## 2023-12-02 RX ADMIN — PRAVASTATIN SODIUM 40 MG: 40 TABLET ORAL at 20:02

## 2023-12-02 RX ADMIN — CALCIUM 500 MG: 500 TABLET ORAL at 08:10

## 2023-12-02 RX ADMIN — WATER 40 MG: 1 INJECTION INTRAMUSCULAR; INTRAVENOUS; SUBCUTANEOUS at 11:49

## 2023-12-02 RX ADMIN — CEFTRIAXONE SODIUM 1000 MG: 1 INJECTION, POWDER, FOR SOLUTION INTRAMUSCULAR; INTRAVENOUS at 20:08

## 2023-12-02 RX ADMIN — GUAIFENESIN 600 MG: 600 TABLET, EXTENDED RELEASE ORAL at 08:16

## 2023-12-02 RX ADMIN — FUROSEMIDE 40 MG: 10 INJECTION, SOLUTION INTRAMUSCULAR; INTRAVENOUS at 10:12

## 2023-12-02 RX ADMIN — ALBUTEROL SULFATE 2.5 MG: 2.5 SOLUTION RESPIRATORY (INHALATION) at 08:48

## 2023-12-02 RX ADMIN — OXYBUTYNIN CHLORIDE 5 MG: 5 TABLET ORAL at 08:10

## 2023-12-02 RX ADMIN — GUAIFENESIN AND CODEINE PHOSPHATE 5 ML: 100; 10 SOLUTION ORAL at 00:24

## 2023-12-02 RX ADMIN — LEVOTHYROXINE SODIUM 88 MCG: 0.09 TABLET ORAL at 05:48

## 2023-12-02 RX ADMIN — DIVALPROEX SODIUM 500 MG: 250 TABLET, DELAYED RELEASE ORAL at 08:09

## 2023-12-02 RX ADMIN — DIVALPROEX SODIUM 500 MG: 250 TABLET, DELAYED RELEASE ORAL at 20:02

## 2023-12-02 RX ADMIN — AZITHROMYCIN DIHYDRATE 500 MG: 250 TABLET, FILM COATED ORAL at 20:02

## 2023-12-02 NOTE — PROGRESS NOTES
Pt up to chair with SBA only. Tolerated slowly but well. Chair alarm in place. Call light in reach. added caregiver Radha Wiggums to contact list per pt request and listed as first contact per pt request.

## 2023-12-02 NOTE — PROGRESS NOTES
spoke with Dr Jayson Davidson regarding consistent cough and congestion and wheezing. order for stat portable chest xray obtained. Pt resting awake and answering questions appropriately. Has developmental delay and does not voice complaint unless prompted. States she is ambulatory at home and was up to chair yesterday. Gave robotussin for cough. PO meds taken easily with no overet s/s of aspiration noted. Remains on 2 liters o2 via nasal cannula with 92% o2 sat. Bed alarm is armed. Call light and phone in easy reach. Assessment completed at bedside.

## 2023-12-02 NOTE — PROGRESS NOTES
Coughing is notably less persisten after solumedrol dose. Assisted to make phone call. Ate 100% of meal. Denied other needs at this time.

## 2023-12-03 LAB
BACTERIA BLD CULT ORG #2: NORMAL
BACTERIA BLD CULT: NORMAL

## 2023-12-03 PROCEDURE — 6360000002 HC RX W HCPCS: Performed by: HOSPITALIST

## 2023-12-03 PROCEDURE — 94761 N-INVAS EAR/PLS OXIMETRY MLT: CPT

## 2023-12-03 PROCEDURE — 2700000000 HC OXYGEN THERAPY PER DAY

## 2023-12-03 PROCEDURE — 6360000002 HC RX W HCPCS: Performed by: NURSE PRACTITIONER

## 2023-12-03 PROCEDURE — 6370000000 HC RX 637 (ALT 250 FOR IP): Performed by: NURSE PRACTITIONER

## 2023-12-03 PROCEDURE — 97166 OT EVAL MOD COMPLEX 45 MIN: CPT

## 2023-12-03 PROCEDURE — 94640 AIRWAY INHALATION TREATMENT: CPT

## 2023-12-03 PROCEDURE — 2580000003 HC RX 258: Performed by: NURSE PRACTITIONER

## 2023-12-03 PROCEDURE — 97530 THERAPEUTIC ACTIVITIES: CPT

## 2023-12-03 PROCEDURE — 97535 SELF CARE MNGMENT TRAINING: CPT

## 2023-12-03 PROCEDURE — 97162 PT EVAL MOD COMPLEX 30 MIN: CPT

## 2023-12-03 PROCEDURE — 2580000003 HC RX 258: Performed by: HOSPITALIST

## 2023-12-03 PROCEDURE — 1200000000 HC SEMI PRIVATE

## 2023-12-03 PROCEDURE — 6370000000 HC RX 637 (ALT 250 FOR IP): Performed by: HOSPITALIST

## 2023-12-03 RX ADMIN — WATER 40 MG: 1 INJECTION INTRAMUSCULAR; INTRAVENOUS; SUBCUTANEOUS at 09:43

## 2023-12-03 RX ADMIN — Medication 10 ML: at 19:41

## 2023-12-03 RX ADMIN — GUAIFENESIN 600 MG: 600 TABLET, EXTENDED RELEASE ORAL at 09:44

## 2023-12-03 RX ADMIN — OXYBUTYNIN CHLORIDE 5 MG: 5 TABLET ORAL at 09:44

## 2023-12-03 RX ADMIN — CALCIUM 500 MG: 500 TABLET ORAL at 09:44

## 2023-12-03 RX ADMIN — PRAVASTATIN SODIUM 40 MG: 40 TABLET ORAL at 19:33

## 2023-12-03 RX ADMIN — ALBUTEROL SULFATE 2.5 MG: 2.5 SOLUTION RESPIRATORY (INHALATION) at 09:29

## 2023-12-03 RX ADMIN — GUAIFENESIN AND CODEINE PHOSPHATE 5 ML: 100; 10 SOLUTION ORAL at 01:06

## 2023-12-03 RX ADMIN — DIVALPROEX SODIUM 500 MG: 250 TABLET, DELAYED RELEASE ORAL at 19:33

## 2023-12-03 RX ADMIN — GUAIFENESIN 600 MG: 600 TABLET, EXTENDED RELEASE ORAL at 19:33

## 2023-12-03 RX ADMIN — LEVOTHYROXINE SODIUM 88 MCG: 0.09 TABLET ORAL at 05:08

## 2023-12-03 RX ADMIN — LAMOTRIGINE 100 MG: 100 TABLET ORAL at 19:33

## 2023-12-03 RX ADMIN — BENZONATATE 200 MG: 100 CAPSULE ORAL at 19:33

## 2023-12-03 RX ADMIN — ALBUTEROL SULFATE 2.5 MG: 2.5 SOLUTION RESPIRATORY (INHALATION) at 21:39

## 2023-12-03 RX ADMIN — Medication 10 ML: at 09:47

## 2023-12-03 RX ADMIN — ENOXAPARIN SODIUM 40 MG: 100 INJECTION SUBCUTANEOUS at 09:43

## 2023-12-03 RX ADMIN — LAMOTRIGINE 100 MG: 100 TABLET ORAL at 09:43

## 2023-12-03 RX ADMIN — CEFTRIAXONE SODIUM 1000 MG: 1 INJECTION, POWDER, FOR SOLUTION INTRAMUSCULAR; INTRAVENOUS at 19:39

## 2023-12-03 RX ADMIN — DIVALPROEX SODIUM 500 MG: 250 TABLET, DELAYED RELEASE ORAL at 09:51

## 2023-12-03 NOTE — CARE COORDINATION
CM attempted to see pt and on phone. Will attempt later as time allows.     Bridgette Gordon RN, BSN  [phone]

## 2023-12-03 NOTE — PROGRESS NOTES
[address] [place] [address] Department   Phone: [phone]    Occupational Therapy    [x] Initial Evaluation            [] Daily Treatment Note         [] Discharge Summary      Patient: Marlene Garrison   : 1960   MRN: [id number]   Date of Service:  12/3/2023    Admitting Diagnosis:  CAP (community acquired pneumonia) due to Chlamydia species  Current Admission Summary: Marlene Garrison is a 66 y.o. female who presents with CAP (community acquired pneumonia) due to Chlamydia species   Past Medical History:  has a past medical history of Developmental disability, Eagle-Gutierrez syndrome, Hyperlipidemia, Hypertension, Seizure ([address]), Seizures ([address]), and Thyroid disease. Past Surgical History:  has a past surgical history that includes Thyroid surgery (). Discharge Recommendations: Marlene Garrison scored a 13/24 on the AM-PAC ADL Inpatient form. Current research shows that an AM-PAC score of 17 or less is typically not associated with a discharge to the patient's home setting. Based on the patient's AM-PAC score and their current ADL deficits, it is recommended that the patient have 3-5 sessions per week of Occupational Therapy at d/c to increase the patient's independence. Please see assessment section for further patient specific details. If patient discharges prior to next session this note will serve as a discharge summary. Please see below for the latest assessment towards goals.       DME Required For Discharge: DME to be determined at next level of care    Precautions/Restrictions: high fall risk  Weight Bearing Restrictions: no restrictions  [] Right Upper Extremity  [] Left Upper Extremity [] Right Lower Extremity  [] Left Lower Extremity     Required Braces/Orthotics: no braces required   [] Right  [] Left  Positional Restrictions:no positional restrictions    Pre-Admission Information   Lives With: alone    Type of Home: apartment  Home Layout: with 02 88-89% with transfers on 2L - increased to 3L and saturation at 91%  Impairments Requiring Therapeutic Intervention: decreased functional mobility, decreased ADL status, decreased endurance, decreased balance  Prognosis: fair  Clinical Assessment: Patient lives in apartment with intermittent assist of caregiver, reports independence with ADLs and ambulates without a walker at baseline. Currently mod A for transfers and dep assist for LB ADLs - 02 dep at this time. Patient presents with the above deficits and would benefit from continued skilled OT to address return to PLOF. Safety Interventions: patient left in chair, chair alarm in place, call light within reach, and nurse notified    Plan  Frequency: 3-5 x/per week  Current Treatment Recommendations: strengthening, balance training, functional mobility training, transfer training, endurance training, patient/caregiver education, and ADL/self-care training    Goals  Patient Goals: to get stronger    Short Term Goals:  Time Frame: discharge   Patient will complete upper body ADL at minimal assistance   Patient will complete lower body ADL at moderate assistance   Patient will complete toileting at moderate assistance   Patient will complete grooming at stand by assistance   Patient will complete functional transfers at minimal assistance   Patient will complete functional mobility at minimal assistance with LRAD    Above goals reviewed on 12/3/2023. All goals are ongoing at this time unless indicated above.        Therapy Session Time     Individual Group Co-treatment   Time In    0803   Time Out    0831   Minutes    28        Timed Code Treatment Minutes:   13  Total Treatment Minutes:  28       Electronically Signed By: Bertram Goldberg OT

## 2023-12-03 NOTE — PROGRESS NOTES
[address] [facility] Department   Phone: [phone]    Physical Therapy    [x] Initial Evaluation            [] Daily Treatment Note         [] Discharge Summary      Patient: Carloz Wright   : 1960   MRN: [id number]   Date of Service:  12/3/2023  Admitting Diagnosis: CAP (community acquired pneumonia) due to Chlamydia species  Current Admission Summary: This is a 66 y.o. female with pertinent past medical history of developmental disability, seizure disorder who was brought in by EMS transportation for cough ongoing for the past 6 days. Patient is nursing home resident, has been receiving NyQuil and DayQuil however today developed fever. Patient also reports sore throat and difficulty breathing. Past Medical History:  has a past medical history of Developmental disability, Eagle-Gutierrez syndrome, Hyperlipidemia, Hypertension, Seizure ([address]), Seizures ([address]), and Thyroid disease. Past Surgical History:  has a past surgical history that includes Thyroid surgery (). Discharge Recommendations: Carloz Wright scored a 13/24 on the AM-PAC short mobility form. Current research shows that an AM-PAC score of 17 or less is typically not associated with a discharge to the patient's home setting. Based on the patient's AM-PAC score and their current functional mobility deficits, it is recommended that the patient have 3-5 sessions per week of Physical Therapy at d/c to increase the patient's independence. Please see assessment section for further patient specific details. If patient discharges prior to next session this note will serve as a discharge summary. Please see below for the latest assessment towards goals.     DME Required For Discharge: DME to be determined at next level of care  Precautions/Restrictions: high fall risk, up as tolerated  Weight Bearing Restrictions: no restrictions     Required Braces/Orthotics: no braces required  Positional Restrictions:no

## 2023-12-03 NOTE — PLAN OF CARE
Problem: Discharge Planning  Goal: Discharge to home or other facility with appropriate resources  12/3/2023 1005 by Mikey Raphael RN  Outcome: Progressing  12/2/2023 2100 by Angelito Ordaz RN  Outcome: Progressing     Problem: Pain  Goal: Verbalizes/displays adequate comfort level or baseline comfort level  12/3/2023 1005 by Mikey Raphael RN  Outcome: Progressing  12/2/2023 2100 by Angelito Ordaz RN  Outcome: Progressing     Problem: Skin/Tissue Integrity  Goal: Absence of new skin breakdown  Description: 1. Monitor for areas of redness and/or skin breakdown  2. Assess vascular access sites hourly  3. Every 4-6 hours minimum:  Change oxygen saturation probe site  4. Every 4-6 hours:  If on nasal continuous positive airway pressure, respiratory therapy assess nares and determine need for appliance change or resting period.   12/3/2023 1005 by Mikey Raphael RN  Outcome: Progressing  12/2/2023 2100 by Angelito Ordaz RN  Outcome: Progressing     Problem: Safety - Adult  Goal: Free from fall injury  12/3/2023 1005 by Mikey Raphael RN  Outcome: Progressing  12/2/2023 2100 by Angelito Ordaz RN  Outcome: Progressing     Problem: ABCDS Injury Assessment  Goal: Absence of physical injury  12/3/2023 1005 by Mikey Raphael RN  Outcome: Progressing  12/2/2023 2100 by Angelito Ordaz RN  Outcome: Progressing

## 2023-12-03 NOTE — PROGRESS NOTES
Shift assessment complete, /65, pt alert, oriented x2, disoriented to time and place, denies any pain or SOB at this time. Pt noted delayed in response. Scheduled med given per [facility], POC and education reviewed with the pt. Pt up in chair after working with therapy. Safety measures in place. All needs met at this time, call light in reach, will continue to monitor.

## 2023-12-04 LAB
ANION GAP SERPL CALCULATED.3IONS-SCNC: 7 MMOL/L (ref 3–16)
BUN SERPL-MCNC: 19 MG/DL (ref 7–20)
CALCIUM SERPL-MCNC: 9.2 MG/DL (ref 8.3–10.6)
CHLORIDE SERPL-SCNC: 102 MMOL/L (ref 99–110)
CO2 SERPL-SCNC: 33 MMOL/L (ref 21–32)
CREAT SERPL-MCNC: 0.6 MG/DL (ref 0.6–1.2)
DEPRECATED RDW RBC AUTO: 15.1 % (ref 12.4–15.4)
GFR SERPLBLD CREATININE-BSD FMLA CKD-EPI: >60 ML/MIN/{1.73_M2}
GLUCOSE SERPL-MCNC: 134 MG/DL (ref 70–99)
HCT VFR BLD AUTO: 36.6 % (ref 36–48)
HGB BLD-MCNC: 12.3 G/DL (ref 12–16)
MCH RBC QN AUTO: 30.7 PG (ref 26–34)
MCHC RBC AUTO-ENTMCNC: 33.7 G/DL (ref 31–36)
MCV RBC AUTO: 91.1 FL (ref 80–100)
PLATELET # BLD AUTO: 296 K/UL (ref 135–450)
PMV BLD AUTO: 7.1 FL (ref 5–10.5)
POTASSIUM SERPL-SCNC: 4.2 MMOL/L (ref 3.5–5.1)
RBC # BLD AUTO: 4.01 M/UL (ref 4–5.2)
SODIUM SERPL-SCNC: 142 MMOL/L (ref 136–145)
WBC # BLD AUTO: 8.1 K/UL (ref 4–11)

## 2023-12-04 PROCEDURE — 6360000002 HC RX W HCPCS: Performed by: NURSE PRACTITIONER

## 2023-12-04 PROCEDURE — 2580000003 HC RX 258: Performed by: NURSE PRACTITIONER

## 2023-12-04 PROCEDURE — 1200000000 HC SEMI PRIVATE

## 2023-12-04 PROCEDURE — 6370000000 HC RX 637 (ALT 250 FOR IP): Performed by: NURSE PRACTITIONER

## 2023-12-04 PROCEDURE — 6370000000 HC RX 637 (ALT 250 FOR IP): Performed by: HOSPITALIST

## 2023-12-04 PROCEDURE — 94680 O2 UPTK RST&XERS DIR SIMPLE: CPT

## 2023-12-04 PROCEDURE — 2580000003 HC RX 258: Performed by: HOSPITALIST

## 2023-12-04 PROCEDURE — 36415 COLL VENOUS BLD VENIPUNCTURE: CPT

## 2023-12-04 PROCEDURE — 6360000002 HC RX W HCPCS: Performed by: HOSPITALIST

## 2023-12-04 PROCEDURE — 80048 BASIC METABOLIC PNL TOTAL CA: CPT

## 2023-12-04 PROCEDURE — 85027 COMPLETE CBC AUTOMATED: CPT

## 2023-12-04 RX ORDER — AMOXICILLIN AND CLAVULANATE POTASSIUM 500; 125 MG/1; MG/1
1 TABLET, FILM COATED ORAL 3 TIMES DAILY
Qty: 30 TABLET | Refills: 0 | Status: SHIPPED | OUTPATIENT
Start: 2023-12-04 | End: 2023-12-12 | Stop reason: HOSPADM

## 2023-12-04 RX ORDER — GUAIFENESIN 600 MG/1
600 TABLET, EXTENDED RELEASE ORAL 2 TIMES DAILY
Qty: 40 TABLET | Refills: 0 | Status: SHIPPED | OUTPATIENT
Start: 2023-12-04 | End: 2023-12-12 | Stop reason: SDUPTHER

## 2023-12-04 RX ORDER — PREDNISONE 20 MG/1
20 TABLET ORAL 2 TIMES DAILY
Qty: 10 TABLET | Refills: 0 | Status: SHIPPED | OUTPATIENT
Start: 2023-12-04 | End: 2023-12-12 | Stop reason: HOSPADM

## 2023-12-04 RX ADMIN — CEFTRIAXONE SODIUM 1000 MG: 1 INJECTION, POWDER, FOR SOLUTION INTRAMUSCULAR; INTRAVENOUS at 21:19

## 2023-12-04 RX ADMIN — LEVOTHYROXINE SODIUM 88 MCG: 0.09 TABLET ORAL at 05:09

## 2023-12-04 RX ADMIN — DIVALPROEX SODIUM 500 MG: 250 TABLET, DELAYED RELEASE ORAL at 21:17

## 2023-12-04 RX ADMIN — ENOXAPARIN SODIUM 40 MG: 100 INJECTION SUBCUTANEOUS at 08:47

## 2023-12-04 RX ADMIN — CALCIUM 500 MG: 500 TABLET ORAL at 08:46

## 2023-12-04 RX ADMIN — OXYBUTYNIN CHLORIDE 5 MG: 5 TABLET ORAL at 08:46

## 2023-12-04 RX ADMIN — Medication 10 ML: at 21:17

## 2023-12-04 RX ADMIN — LAMOTRIGINE 100 MG: 100 TABLET ORAL at 08:46

## 2023-12-04 RX ADMIN — Medication 10 ML: at 08:50

## 2023-12-04 RX ADMIN — GUAIFENESIN 600 MG: 600 TABLET, EXTENDED RELEASE ORAL at 21:17

## 2023-12-04 RX ADMIN — GUAIFENESIN 600 MG: 600 TABLET, EXTENDED RELEASE ORAL at 08:46

## 2023-12-04 RX ADMIN — GUAIFENESIN AND CODEINE PHOSPHATE 5 ML: 100; 10 SOLUTION ORAL at 02:40

## 2023-12-04 RX ADMIN — BENZONATATE 200 MG: 100 CAPSULE ORAL at 21:17

## 2023-12-04 RX ADMIN — LAMOTRIGINE 100 MG: 100 TABLET ORAL at 21:17

## 2023-12-04 RX ADMIN — DIVALPROEX SODIUM 500 MG: 250 TABLET, DELAYED RELEASE ORAL at 08:46

## 2023-12-04 RX ADMIN — PRAVASTATIN SODIUM 40 MG: 40 TABLET ORAL at 21:17

## 2023-12-04 RX ADMIN — WATER 40 MG: 1 INJECTION INTRAMUSCULAR; INTRAVENOUS; SUBCUTANEOUS at 08:46

## 2023-12-04 NOTE — PLAN OF CARE
Problem: Discharge Planning  Goal: Discharge to home or other facility with appropriate resources  12/3/2023 2151 by Reg Carlton RN  Outcome: Progressing  12/3/2023 1005 by Laz Harding RN  Outcome: Progressing     Problem: Pain  Goal: Verbalizes/displays adequate comfort level or baseline comfort level  12/3/2023 2151 by Reg Carlton RN  Outcome: Progressing  12/3/2023 1005 by Laz Harding RN  Outcome: Progressing     Problem: Skin/Tissue Integrity  Goal: Absence of new skin breakdown  Description: 1. Monitor for areas of redness and/or skin breakdown  2. Assess vascular access sites hourly  3. Every 4-6 hours minimum:  Change oxygen saturation probe site  4. Every 4-6 hours:  If on nasal continuous positive airway pressure, respiratory therapy assess nares and determine need for appliance change or resting period.   12/3/2023 2151 by Reg Carlton RN  Outcome: Progressing  12/3/2023 1005 by Laz Harding RN  Outcome: Progressing     Problem: Safety - Adult  Goal: Free from fall injury  12/3/2023 2151 by Reg Carlton RN  Outcome: Progressing  12/3/2023 1005 by Laz Hard[facility] RN  Outcome: Progressing     Problem: ABCDS Injury Assessment  Goal: Absence of physical injury  12/3/2023 2151 by Reg Carlton RN  Outcome: Progressing  12/3/2023 1005 by Laz Harding RN  Outcome: Progressing

## 2023-12-04 NOTE — PROGRESS NOTES
12/04/23 1305   Resting (Room Air)   SpO2 87   HR 58   Resting (On O2)   SpO2 93  (3l)   HR 59   O2 Device Nasal cannula   O2 Flow Rate (l/min) 3 l/min   Comments 88% on 1l; 89% on 2l; and 93% on 3l   During Walk (Room Air)   SpO2 87  (modified walk test, wheelchairbound, modified activity)   HR 60   Rate of Dyspnea 1   Symptoms Shortness of breath   During Walk (On O2)   SpO2 88   HR 60   O2 Device Nasal cannula   O2 Flow Rate (l/min) 1 l/min   Need Additional O2 Flow Rate Rows Yes   O2 Flow Rate (l/min) 2 l/min   O2 Saturation 89   O2 Flow Rate (l/min) 3 l/min   O2 Saturation 92   Rate of Dyspnea 1   After Walk   SpO2 93   HR 60   O2 Device Nasal cannula   O2 Flow Rate (l/min) 3 l/min   Rate of Dyspnea 1   Symptoms Shortness of breath   Does the Patient Qualify for Home O2 Yes   Liter Flow at Rest 3   Liter Flow on Exertion 3   Does the Patient Need Portable Oxygen Tanks Yes

## 2023-12-04 NOTE — PLAN OF CARE
Problem: Discharge Planning  Goal: Discharge to home or other facility with appropriate resources  12/4/2023 0845 by Prateek Vazquez RN  Outcome: Progressing     Problem: Pain  Goal: Verbalizes/displays adequate comfort level or baseline comfort level  12/4/2023 0845 by Prateek Vazquez RN  Outcome: Progressing     Problem: Skin/Tissue Integrity  Goal: Absence of new skin breakdown  Description: 1. Monitor for areas of redness and/or skin breakdown  2. Assess vascular access sites hourly  3. Every 4-6 hours minimum:  Change oxygen saturation probe site  4. Every 4-6 hours:  If on nasal continuous positive airway pressure, respiratory therapy assess nares and determine need for appliance change or resting period.   12/4/2023 0845 by Prateek Vazquez RN  Outcome: Progressing     Problem: Safety - Adult  Goal: Free from fall injury  12/4/2023 0845 by Prateek Vazquez RN  Outcome: Progressing     Problem: ABCDS Injury Assessment  Goal: Absence of physical injury  12/4/2023 0845 by Prateek Vazquez RN  Outcome: Progressing

## 2023-12-04 NOTE — CARE COORDINATION
Discharge Planning:     (CM) spoke with patient about skilled nursing facility (SNF) placement recommended by therapy. Patient reported that she has been having trouble walking. Patient reported that she wanted CM to call Christal Arm on emergency contact list), who is her Caregiver, to ask her opinion. CM called Nickaliya Hebert and placed her on speaker phone while talking with patient. Sumit Hebert reported that patient has been at [facility] before and patient agreed to be referred there again. Sumit Hebert also informed that Caregiving staff, Ariel Cornejo, had given the patient Blayne's Cough Syrup which she doesn't feel he should have done. CM called [facility] admissions staff, Severino Sena, who reported that he won't have a bed available for multiple days. CM informed Sumit Hebert of above information. Sumit Hebert chose for patient to be referred to the following skilled nursing facilities and patient agreed to the following SNF referrals:    -[facility]: Voicemail left for admissions staff, [facility] Jamaal ([phone]), requesting a callback with referral decision.    -[address]: Referral faxed to admissions staff at [phone]. Voicemail left for admissions staff, Mo De La Fuente ([phone]), requesting a callback with referral decision. CM was provided patient's Sister's number by Caregiving staff, Ariel Cornejo, as Víctor Barrios ([phone]). Patient confirmed this is her Sister. Patient confirmed she has also been speaking with her Sister during this admission. Sister updated on above referrals and in agreement with SNF placement for patient. CM updated CM Agnes Lockwood, on above information to follow for discharge.         Bharti GERBER, NYDIA, [facility] -   [phone]    Electronically signed by ZABRINA Joyner on 12/4/2023 at 3:48 PM

## 2023-12-04 NOTE — PROGRESS NOTES
12/04/23 0542   RT Protocol   History Pulmonary Disease 0   Respiratory pattern 0   Breath sounds 2   Cough 0   Indications for Bronchodilator Therapy None   Bronchodilator Assessment Score 2

## 2023-12-04 NOTE — PROGRESS NOTES
Pt. Assessment complete. Pt. Denies any complaints at this time. Safety and fall precautions remain. All needs met.

## 2023-12-05 ENCOUNTER — APPOINTMENT (OUTPATIENT)
Dept: CT IMAGING | Age: 63
DRG: 175 | End: 2023-12-05
Payer: MEDICARE

## 2023-12-05 PROBLEM — E66.09 CLASS 2 OBESITY DUE TO EXCESS CALORIES WITH BODY MASS INDEX (BMI) OF 37.0 TO 37.9 IN ADULT: Status: ACTIVE | Noted: 2023-12-05

## 2023-12-05 PROBLEM — G40.909 SEIZURE DISORDER (HCC): Status: ACTIVE | Noted: 2023-12-05

## 2023-12-05 PROBLEM — R62.50 DEVELOPMENTAL DELAY: Status: ACTIVE | Noted: 2023-12-05

## 2023-12-05 PROBLEM — J18.9 PNEUMONIA DUE TO INFECTIOUS ORGANISM: Status: ACTIVE | Noted: 2023-12-05

## 2023-12-05 PROBLEM — J18.9 MULTIFOCAL PNEUMONIA: Status: ACTIVE | Noted: 2023-11-29

## 2023-12-05 PROBLEM — I10 ESSENTIAL HYPERTENSION: Status: ACTIVE | Noted: 2023-12-05

## 2023-12-05 LAB — PROCALCITONIN SERPL IA-MCNC: 0.07 NG/ML (ref 0–0.15)

## 2023-12-05 PROCEDURE — 6370000000 HC RX 637 (ALT 250 FOR IP): Performed by: NURSE PRACTITIONER

## 2023-12-05 PROCEDURE — 6360000004 HC RX CONTRAST MEDICATION: Performed by: INTERNAL MEDICINE

## 2023-12-05 PROCEDURE — 71260 CT THORAX DX C+: CPT

## 2023-12-05 PROCEDURE — 36415 COLL VENOUS BLD VENIPUNCTURE: CPT

## 2023-12-05 PROCEDURE — 84145 PROCALCITONIN (PCT): CPT

## 2023-12-05 PROCEDURE — 93970 EXTREMITY STUDY: CPT

## 2023-12-05 PROCEDURE — 2580000003 HC RX 258: Performed by: INTERNAL MEDICINE

## 2023-12-05 PROCEDURE — 94761 N-INVAS EAR/PLS OXIMETRY MLT: CPT

## 2023-12-05 PROCEDURE — 99223 1ST HOSP IP/OBS HIGH 75: CPT | Performed by: INTERNAL MEDICINE

## 2023-12-05 PROCEDURE — 6360000002 HC RX W HCPCS: Performed by: INTERNAL MEDICINE

## 2023-12-05 PROCEDURE — 6360000002 HC RX W HCPCS: Performed by: NURSE PRACTITIONER

## 2023-12-05 PROCEDURE — 2580000003 HC RX 258: Performed by: NURSE PRACTITIONER

## 2023-12-05 PROCEDURE — 2700000000 HC OXYGEN THERAPY PER DAY

## 2023-12-05 PROCEDURE — 1200000000 HC SEMI PRIVATE

## 2023-12-05 PROCEDURE — 2580000003 HC RX 258: Performed by: HOSPITALIST

## 2023-12-05 PROCEDURE — 6360000002 HC RX W HCPCS: Performed by: HOSPITALIST

## 2023-12-05 PROCEDURE — 6370000000 HC RX 637 (ALT 250 FOR IP): Performed by: HOSPITALIST

## 2023-12-05 PROCEDURE — 94640 AIRWAY INHALATION TREATMENT: CPT

## 2023-12-05 RX ORDER — ENOXAPARIN SODIUM 100 MG/ML
1 INJECTION SUBCUTANEOUS 2 TIMES DAILY
Status: DISCONTINUED | OUTPATIENT
Start: 2023-12-05 | End: 2023-12-07

## 2023-12-05 RX ORDER — ALBUTEROL SULFATE 2.5 MG/3ML
2.5 SOLUTION RESPIRATORY (INHALATION) EVERY 4 HOURS PRN
Status: DISCONTINUED | OUTPATIENT
Start: 2023-12-05 | End: 2023-12-09

## 2023-12-05 RX ORDER — ALBUTEROL SULFATE 2.5 MG/3ML
2.5 SOLUTION RESPIRATORY (INHALATION)
Status: DISCONTINUED | OUTPATIENT
Start: 2023-12-05 | End: 2023-12-05

## 2023-12-05 RX ADMIN — Medication 10 ML: at 09:04

## 2023-12-05 RX ADMIN — DIVALPROEX SODIUM 500 MG: 250 TABLET, DELAYED RELEASE ORAL at 21:38

## 2023-12-05 RX ADMIN — IOPAMIDOL 75 ML: 755 INJECTION, SOLUTION INTRAVENOUS at 12:45

## 2023-12-05 RX ADMIN — DIVALPROEX SODIUM 500 MG: 250 TABLET, DELAYED RELEASE ORAL at 09:03

## 2023-12-05 RX ADMIN — ENOXAPARIN SODIUM 90 MG: 100 INJECTION SUBCUTANEOUS at 17:12

## 2023-12-05 RX ADMIN — WATER 40 MG: 1 INJECTION INTRAMUSCULAR; INTRAVENOUS; SUBCUTANEOUS at 09:04

## 2023-12-05 RX ADMIN — CALCIUM 500 MG: 500 TABLET ORAL at 09:04

## 2023-12-05 RX ADMIN — AZITHROMYCIN MONOHYDRATE 500 MG: 500 INJECTION, POWDER, LYOPHILIZED, FOR SOLUTION INTRAVENOUS at 17:27

## 2023-12-05 RX ADMIN — LEVOTHYROXINE SODIUM 88 MCG: 0.09 TABLET ORAL at 07:49

## 2023-12-05 RX ADMIN — GUAIFENESIN 600 MG: 600 TABLET, EXTENDED RELEASE ORAL at 09:03

## 2023-12-05 RX ADMIN — ALBUTEROL SULFATE 2.5 MG: 2.5 SOLUTION RESPIRATORY (INHALATION) at 13:20

## 2023-12-05 RX ADMIN — BENZONATATE 200 MG: 100 CAPSULE ORAL at 17:28

## 2023-12-05 RX ADMIN — OXYBUTYNIN CHLORIDE 5 MG: 5 TABLET ORAL at 09:04

## 2023-12-05 RX ADMIN — ENOXAPARIN SODIUM 40 MG: 100 INJECTION SUBCUTANEOUS at 09:04

## 2023-12-05 RX ADMIN — LAMOTRIGINE 100 MG: 100 TABLET ORAL at 21:38

## 2023-12-05 RX ADMIN — Medication 10 ML: at 21:38

## 2023-12-05 RX ADMIN — GUAIFENESIN 600 MG: 600 TABLET, EXTENDED RELEASE ORAL at 21:38

## 2023-12-05 RX ADMIN — LAMOTRIGINE 100 MG: 100 TABLET ORAL at 09:03

## 2023-12-05 RX ADMIN — CEFTRIAXONE SODIUM 1000 MG: 1 INJECTION, POWDER, FOR SOLUTION INTRAMUSCULAR; INTRAVENOUS at 21:41

## 2023-12-05 RX ADMIN — PRAVASTATIN SODIUM 40 MG: 40 TABLET ORAL at 21:38

## 2023-12-05 NOTE — PROGRESS NOTES
Occupational/Physical Therapy  Daisy Gibson  1960    12/05/23    Attempted OT/PT treatment with pt currently LINNEA for CT. Will re-attempt as schedule and pt status allow.      Thank you,    Garcia Andino, BRITTNEYR/L, C/NDT ([id number])  Yadira Camara, PT, DPT [id number]

## 2023-12-05 NOTE — CARE COORDINATION
USAMA called Cheryl at [facility], [phone], who stated yes she received the referral and they are reviewing it. Called and left a message for Jr Singh in admissions at [facility]. Waiting on calls back. Addendum:  Met with patient to inform that both facilities accepted. She asked to call Jennifer Frederick and ask her which one Jennifer Frederick prefers. Called Jennifer Frederick and left a message. Waiting on a return call.     Electronically signed by ZABRINA Montemayor, ESTEPHANIEW on 12/5/2023 at 12:06 PM

## 2023-12-05 NOTE — CONSULTS
[facility]   CONSULT NOTE    12/5/2023 5:42 PM    Patient Information: Manda Eduardo   Date of Admit:  11/29/2023  Primary Care Physician:  Coleen Smith MD  Requesting Physician:  Bernadette Glover MD    Reason for consult:    Hematology/oncology was consulted for PE    Chief complaint:    Hematology/oncology was consulted for PE    History of Present Illness:    66 y.o. female with pmh of Eagle-Gutierrez Syndrome, HLD, HTN, Seizures who presents with cough and shortness of breath. Found to have multifocal pneumonia and given persistent hypoxia and shortness of breath during the course of her admission underwent a CT scan of the thorax which showed pulmonary emboli for which hematology/oncology was consulted. Past Medical History:     has a past medical history of Developmental disability, Eagle-Gutierrez syndrome, Hyperlipidemia, Hypertension, Seizure ([address]), Seizures ([address]), and Thyroid disease. Past Surgical History:    Past Surgical History:   Procedure Laterality Date    THYROID SURGERY  1986    removed            Current Medications:     enoxaparin  1 mg/kg SubCUTAneous BID    cefTRIAXone (ROCEPHIN) IV  1,000 mg IntraVENous Q24H    azithromycin  500 mg IntraVENous Q24H    levothyroxine  88 mcg Oral Daily    calcium elemental  500 mg Oral Daily    divalproex  500 mg Oral BID    lamoTRIgine  100 mg Oral BID    oxybutynin  5 mg Oral Daily    pravastatin  40 mg Oral Daily    sodium chloride flush  5-40 mL IntraVENous 2 times per day    guaiFENesin  600 mg Oral BID           Allergies:    No Known Allergies     Social History:    reports that she has never smoked. She does not have any smokeless tobacco history on file. She reports that she does not drink alcohol and does not use drugs. Family History:     family history includes Cancer in an other family member; Seizures in an other family member; Stroke in an other family member.          ROS:  14 point review of systems performed pulmonary emboli in the right lung  -Agree with Lovenox 1 mg/kg twice daily while inpatient.  -At discharge can be switched to a DOAC  -Suspect her pulmonary emboli are provoked secondary to active infection  -Bilateral lower extremity venous duplex has not been read yet but has been done.  -Can follow-up as an outpatient for management of anticoagulation.       Jessica Schmidt MD  [facility]

## 2023-12-05 NOTE — CONSULTS
11/29/2023. I have been consulted to see the patient for above mentioned reason(s). The patient has multiple medical comorbidities, and presented to the ER for concern for cough symptoms that were going on for around 6 days before presentation to the ER. The patient has history of seizure disorder developmental delays and is a nursing home resident. The patient was getting symptomatic treatment with DayQuil and NyQuil at nursing home but developed a fever and sore throat and difficulty breathing and hence was sent to the ER. The patient's symptoms were reportedly gradual in onset, worsening in nature with no aggravating or relieving factors. The patient had a fever of 101.2 on presentation to the ER. Blood cultures were done on admission. The patient was started on IV ceftriaxone and azithromycin. White cell count is 8100 today. Patient had a CT angiogram of the chest done earlier today which showed multifocal lung infiltrates. I have been asked for my opinion for management for this patient. Past Medical History: All past medical history reviewed today. Past Medical History:   Diagnosis Date    Developmental disability     Eagle-Gutierrez syndrome     Hyperlipidemia     Hypertension     Seizure ([address])     Seizures ([address])     Thyroid disease          Past Surgical History: All pastsurgical history was reviewed today. Past Surgical History:   Procedure Laterality Date    THYROID SURGERY  1986    removed         Family History: All family history was reviewed today. Problem Relation Age of Onset    Cancer Other     Seizures Other     Stroke Other          Medications: All current and past medications were reviewed.     Medications Prior to Admission: Multiple Vitamins-Minerals (CERTAVITE/ANTIOXIDANTS PO), Take 1 tablet by mouth Daily  [DISCONTINUED] hydroCHLOROthiazide (HYDRODIURIL) 25 MG tablet, Take 1 tablet by mouth daily  divalproex (DEPAKOTE) 500 MG DR tablet, Take 1 Skin:     General: Skin is warm and dry. Coloration: Skin is not jaundiced. Findings: No bruising, erythema or rash. Neurological:      General: No focal deficit present. Mental Status: She is alert and oriented to person, place, and time. Mental status is at baseline. Motor: No abnormal muscle tone. Psychiatric:         Mood and Affect: Mood normal.         Behavior: Behavior normal.           Lines and drains: All vascular access sites are healthy with no local erythema, discharge or tenderness. Intake and output:     I/O last 3 completed shifts: In: 56 [P.O.:600; I.V.:10]  Out: 1650 [Urine:1650]    Lab Data:   All available labs were reviewed by me today. CBC:   Recent Labs     12/04/23  1033   WBC 8.1   RBC 4.01   HGB 12.3   HCT 36.6      MCV 91.1   MCH 30.7   MCHC 33.7   RDW 15.1        BMP:  Recent Labs     12/04/23  1033      K 4.2      CO2 33*   BUN 19   CREATININE 0.6   CALCIUM 9.2   GLUCOSE 134*        Hepatic FunctionPanel:   Lab Results   Component Value Date/Time    ALKPHOS 64 11/30/2023 05:26 AM    ALT 17 11/30/2023 05:26 AM    AST 18 11/30/2023 05:26 AM    PROT 6.7 11/30/2023 05:26 AM    BILITOT 0.3 11/30/2023 05:26 AM    LABALBU 3.5 11/30/2023 05:26 AM       CPK: No results found for: \"CKTOTAL\"  ESR: No results found for: \"SEDRATE\"  CRP: No results found for: \"CRP\"      Imaging: All pertinent images and reports for the current visit were reviewed by me during this visit. I reviewed the chest x-ray/CT scan/MRI images and independently interpreted the findings and results today. CT CHEST PULMONARY EMBOLISM W CONTRAST   Final Result   1. Multiple acute right lower lobe pulmonary emboli. 2. Multifocal bilateral pneumonia. Findings were discussed with CHRIS BRAY at 2:36 pm on 12/5/2023. XR CHEST PORTABLE   Final Result   Left perihilar infiltrates. Possible small left-sided pleural effusion.          XR CHEST PORTABLE

## 2023-12-05 NOTE — PLAN OF CARE
Problem: Pain  Goal: Verbalizes/displays adequate comfort level or baseline comfort level  12/5/2023 0902 by Brandi Sheth RN  Outcome: Progressing     Problem: Skin/Tissue Integrity  Goal: Absence of new skin breakdown  Description: 1. Monitor for areas of redness and/or skin breakdown  2. Assess vascular access sites hourly  3. Every 4-6 hours minimum:  Change oxygen saturation probe site  4. Every 4-6 hours:  If on nasal continuous positive airway pressure, respiratory therapy assess nares and determine need for appliance change or resting period.   12/5/2023 0902 by Brandi Sheth RN  Outcome: Progressing     Problem: Safety - Adult  Goal: Free from fall injury  12/5/2023 0902 by Brnadi Sheth RN  Outcome: Progressing     Problem: ABCDS Injury Assessment  Goal: Absence of physical injury  12/5/2023 0902 by Brandi Sheth RN  Outcome: Progressing

## 2023-12-06 LAB
ALBUMIN SERPL-MCNC: 3.1 G/DL (ref 3.4–5)
ALBUMIN/GLOB SERPL: 1 {RATIO} (ref 1.1–2.2)
ALP SERPL-CCNC: 55 U/L (ref 40–129)
ALT SERPL-CCNC: 21 U/L (ref 10–40)
ANION GAP SERPL CALCULATED.3IONS-SCNC: 5 MMOL/L (ref 3–16)
AST SERPL-CCNC: 20 U/L (ref 15–37)
BASOPHILS # BLD: 0 K/UL (ref 0–0.2)
BASOPHILS NFR BLD: 0 %
BILIRUB SERPL-MCNC: <0.2 MG/DL (ref 0–1)
BUN SERPL-MCNC: 23 MG/DL (ref 7–20)
CALCIUM SERPL-MCNC: 9 MG/DL (ref 8.3–10.6)
CHLORIDE SERPL-SCNC: 103 MMOL/L (ref 99–110)
CO2 SERPL-SCNC: 33 MMOL/L (ref 21–32)
CREAT SERPL-MCNC: 0.6 MG/DL (ref 0.6–1.2)
DEPRECATED RDW RBC AUTO: 14.9 % (ref 12.4–15.4)
EOSINOPHIL # BLD: 0 K/UL (ref 0–0.6)
EOSINOPHIL NFR BLD: 0 %
GFR SERPLBLD CREATININE-BSD FMLA CKD-EPI: >60 ML/MIN/{1.73_M2}
GLUCOSE SERPL-MCNC: 78 MG/DL (ref 70–99)
HCT VFR BLD AUTO: 36.4 % (ref 36–48)
HGB BLD-MCNC: 12.6 G/DL (ref 12–16)
LYMPHOCYTES # BLD: 3.3 K/UL (ref 1–5.1)
LYMPHOCYTES NFR BLD: 35 %
MAGNESIUM SERPL-MCNC: 2.3 MG/DL (ref 1.8–2.4)
MCH RBC QN AUTO: 34.1 PG (ref 26–34)
MCHC RBC AUTO-ENTMCNC: 34.7 G/DL (ref 31–36)
MCV RBC AUTO: 98.2 FL (ref 80–100)
MONOCYTES # BLD: 0.9 K/UL (ref 0–1.3)
MONOCYTES NFR BLD: 10 %
NEUTROPHILS # BLD: 5.2 K/UL (ref 1.7–7.7)
NEUTROPHILS NFR BLD: 55 %
PHOSPHATE SERPL-MCNC: 3.3 MG/DL (ref 2.5–4.9)
PLATELET # BLD AUTO: 277 K/UL (ref 135–450)
PLATELET BLD QL SMEAR: ADEQUATE
PMV BLD AUTO: 7.6 FL (ref 5–10.5)
POTASSIUM SERPL-SCNC: 3.9 MMOL/L (ref 3.5–5.1)
PROT SERPL-MCNC: 6.1 G/DL (ref 6.4–8.2)
RBC # BLD AUTO: 3.71 M/UL (ref 4–5.2)
RBC MORPH BLD: NORMAL
SLIDE REVIEW: ABNORMAL
SMUDGE CELLS BLD QL SMEAR: PRESENT
SODIUM SERPL-SCNC: 141 MMOL/L (ref 136–145)
WBC # BLD AUTO: 9.4 K/UL (ref 4–11)

## 2023-12-06 PROCEDURE — 6370000000 HC RX 637 (ALT 250 FOR IP): Performed by: HOSPITALIST

## 2023-12-06 PROCEDURE — 80053 COMPREHEN METABOLIC PANEL: CPT

## 2023-12-06 PROCEDURE — 6360000002 HC RX W HCPCS: Performed by: INTERNAL MEDICINE

## 2023-12-06 PROCEDURE — 99232 SBSQ HOSP IP/OBS MODERATE 35: CPT | Performed by: INTERNAL MEDICINE

## 2023-12-06 PROCEDURE — 2580000003 HC RX 258: Performed by: NURSE PRACTITIONER

## 2023-12-06 PROCEDURE — 93306 TTE W/DOPPLER COMPLETE: CPT

## 2023-12-06 PROCEDURE — 6370000000 HC RX 637 (ALT 250 FOR IP): Performed by: NURSE PRACTITIONER

## 2023-12-06 PROCEDURE — 1200000000 HC SEMI PRIVATE

## 2023-12-06 PROCEDURE — 2700000000 HC OXYGEN THERAPY PER DAY

## 2023-12-06 PROCEDURE — 94761 N-INVAS EAR/PLS OXIMETRY MLT: CPT

## 2023-12-06 PROCEDURE — 85025 COMPLETE CBC W/AUTO DIFF WBC: CPT

## 2023-12-06 PROCEDURE — 94640 AIRWAY INHALATION TREATMENT: CPT

## 2023-12-06 PROCEDURE — 36415 COLL VENOUS BLD VENIPUNCTURE: CPT

## 2023-12-06 PROCEDURE — 84100 ASSAY OF PHOSPHORUS: CPT

## 2023-12-06 PROCEDURE — 83735 ASSAY OF MAGNESIUM: CPT

## 2023-12-06 RX ORDER — BENZONATATE 100 MG/1
200 CAPSULE ORAL 3 TIMES DAILY PRN
Status: DISCONTINUED | OUTPATIENT
Start: 2023-12-06 | End: 2023-12-11

## 2023-12-06 RX ADMIN — ENOXAPARIN SODIUM 90 MG: 100 INJECTION SUBCUTANEOUS at 17:54

## 2023-12-06 RX ADMIN — CALCIUM 500 MG: 500 TABLET ORAL at 08:30

## 2023-12-06 RX ADMIN — LAMOTRIGINE 100 MG: 100 TABLET ORAL at 21:03

## 2023-12-06 RX ADMIN — LAMOTRIGINE 100 MG: 100 TABLET ORAL at 08:30

## 2023-12-06 RX ADMIN — GUAIFENESIN 600 MG: 600 TABLET, EXTENDED RELEASE ORAL at 08:30

## 2023-12-06 RX ADMIN — GUAIFENESIN 600 MG: 600 TABLET, EXTENDED RELEASE ORAL at 21:03

## 2023-12-06 RX ADMIN — DIVALPROEX SODIUM 500 MG: 250 TABLET, DELAYED RELEASE ORAL at 08:30

## 2023-12-06 RX ADMIN — ENOXAPARIN SODIUM 90 MG: 100 INJECTION SUBCUTANEOUS at 06:20

## 2023-12-06 RX ADMIN — OXYBUTYNIN CHLORIDE 5 MG: 5 TABLET ORAL at 08:30

## 2023-12-06 RX ADMIN — LEVOTHYROXINE SODIUM 88 MCG: 0.09 TABLET ORAL at 06:20

## 2023-12-06 RX ADMIN — ALBUTEROL SULFATE 2.5 MG: 2.5 SOLUTION RESPIRATORY (INHALATION) at 22:39

## 2023-12-06 RX ADMIN — Medication 10 ML: at 21:02

## 2023-12-06 RX ADMIN — PRAVASTATIN SODIUM 40 MG: 40 TABLET ORAL at 21:03

## 2023-12-06 RX ADMIN — DIVALPROEX SODIUM 500 MG: 250 TABLET, DELAYED RELEASE ORAL at 21:03

## 2023-12-06 NOTE — PLAN OF CARE
Problem: Discharge Planning  Goal: Discharge to home or other facility with appropriate resources  12/6/2023 1025 by Francis Sal RN  Outcome: Progressing     Problem: Pain  Goal: Verbalizes/displays adequate comfort level or baseline comfort level  12/6/2023 1025 by Francis Sal RN  Outcome: Progressing     Problem: Safety - Adult  Goal: Free from fall injury  12/6/2023 1025 by Francis Sal RN  Outcome: Progressing

## 2023-12-06 NOTE — PROGRESS NOTES
Pt awake in bed. Alert and oriented but needs reinforcement due to developmental delay. Pt denies any pain. Darrelyn Percy in place and sometimes incontinent. Swallows pills with water safely. Remains on 3 liters NC. VSS. Instructed to call out for any needs. Call light in reach, will continue to monitor.

## 2023-12-06 NOTE — CARE COORDINATION
CM called and spoke to [employer], per SW note pt wants [employer] to decide on facility. [employer] states she would like her to go to [facility]. IVIS called Cheryl at [facility] and left  informing.      Alberto Griffiths RN, BSN  [phone]

## 2023-12-06 NOTE — CARE COORDINATION
Per IDR pt may still have a couple of days before medically ready for discharge. VM received from [address] [place] [address] at [address] [place] [address] that they can take pt when she is ready. Pt has Medicare and no pre cert needed. Pt will need a HENS.     Rohit Ashford RN, BSN  [phone]

## 2023-12-06 NOTE — PROGRESS NOTES
Shift assessment complete; pt comfortably resting in bed. VSS. AM meds given per the [facility]. Call light and belongings within reach. The care plan and education has been reviewed and mutually agreed upon with the patient.

## 2023-12-06 NOTE — PROGRESS NOTES
Infectious Diseases   Progress Note      Admission Date: 11/29/2023  Hospital Day: Hospital Day: 8   Attending: Bernadette Glover MD  Date of service: 12/6/2023     Chief complaint/ Reason for consult:     Multifocal pneumonia  Fever on admission  Pulmonary emboli involving the right lower lobe  Essential hypertension    Microbiology:        I have reviewed allavailable micro lab data and cultures    Blood culture (2/2) - collected on 11/29/2023: Negative    Antibiotics and immunizations:       Current antibiotics: All antibiotics and their doses were reviewed by me    Recent Abx Admin                     cefTRIAXone (ROCEPHIN) 1,000 mg in sodium chloride 0.9 % 50 mL IVPB (mini-bag) (mg) 1,000 mg New Bag 12/05/23 2141    azithromycin (ZITHROMAX) 500 mg in sodium chloride 0.9 % 250 mL IVPB ([id number]) (mg) 500 mg New Bag 12/05/23 1727                      Immunization History: All immunization history was reviewed by me today. Immunization History   Administered Date(s) Administered    COVID-19, MODERNA BLUE border, Primary or Immunocompromised, (age 12y+), IM, 100 mcg/0.5mL 02/05/2021, 03/05/2021    COVID-19, PFIZER Bivalent, DO NOT Dilute, (age 12y+), IM, 30 mcg/0.3 mL 09/14/2022       Known drug allergies: All allergies were reviewed and updated    No Known Allergies    Social history:     Social History:  All social andepidemiologic history was reviewed and updated by me today as needed. Tobacco use:   reports that she has never smoked. She does not have any smokeless tobacco history on file. Alcohol use:   reports no history of alcohol use. Currently lives in: [address] [place] [address]   reports no history of drug use.      COVID VACCINATION AND LAB RESULT RECORDS:     Internal Administration   First Dose COVID-19, MODERNA BLUE border, Primary or Immunocompromised, (age 12y+), IM, 100 mcg/0.5mL  02/05/2021   Second Dose COVID-19, MODERNA BLUE border, Primary or Immunocompromised, (age 12y+), IM, 100 CMP   Continue to watch for following: new or worsening fever, new hypotension, hives, lip swelling and redness or purulence at vascular access sites. I/v access Management:    Continue to monitor i.v access sites for erythema, induration, discharge or tenderness. As always, continue efforts to minimize tubes/lines/drains as clinically appropriate to reduce chances of line associated infections. Patient education and counseling: The patient was educated in detail about the side-effects of various antibiotics and things to watch for like new rashes, lip swelling, severe reaction, worsening diarrhea, break through fever etc.  Discussed patient's condition and what to expect. All of the patient's questions were addressed in a satisfactory manner and patient verbalized understanding all instructions. Level of complexity of visit and medical decision making: High       Thank you for involving me in the care of your patient. I will continue to follow. If you have anyadditional questions, please do not hesitate to contact me. Subjective: Interval history: Interval history was obtained from chart review and patient/ RN. The patient is afebrile. She is on serosanguineous addenda. She is tolerating antibiotics okay. No diarrhea     REVIEW OF SYSTEMS:      Review of Systems   Constitutional:  Negative for chills, diaphoresis and fever. HENT:  Negative for ear discharge, ear pain, postnasal drip, rhinorrhea, sinus pressure, sinus pain and sore throat. Eyes:  Negative for discharge and redness. Respiratory:  Positive for cough. Negative for shortness of breath and wheezing. Cardiovascular:  Negative for chest pain and leg swelling. Gastrointestinal:  Negative for abdominal pain, constipation, diarrhea and nausea. Endocrine: Negative for cold intolerance, heat intolerance and polydipsia. Genitourinary:  Negative for dysuria, flank pain, frequency, hematuria and urgency.

## 2023-12-06 NOTE — DISCHARGE INSTR - DIET

## 2023-12-07 PROCEDURE — 97530 THERAPEUTIC ACTIVITIES: CPT

## 2023-12-07 PROCEDURE — 6370000000 HC RX 637 (ALT 250 FOR IP): Performed by: HOSPITALIST

## 2023-12-07 PROCEDURE — 6370000000 HC RX 637 (ALT 250 FOR IP): Performed by: NURSE PRACTITIONER

## 2023-12-07 PROCEDURE — 1200000000 HC SEMI PRIVATE

## 2023-12-07 PROCEDURE — 99231 SBSQ HOSP IP/OBS SF/LOW 25: CPT | Performed by: INTERNAL MEDICINE

## 2023-12-07 PROCEDURE — 6360000002 HC RX W HCPCS: Performed by: INTERNAL MEDICINE

## 2023-12-07 PROCEDURE — 2580000003 HC RX 258: Performed by: NURSE PRACTITIONER

## 2023-12-07 PROCEDURE — 94761 N-INVAS EAR/PLS OXIMETRY MLT: CPT

## 2023-12-07 PROCEDURE — 6370000000 HC RX 637 (ALT 250 FOR IP): Performed by: INTERNAL MEDICINE

## 2023-12-07 PROCEDURE — 97116 GAIT TRAINING THERAPY: CPT

## 2023-12-07 PROCEDURE — 2700000000 HC OXYGEN THERAPY PER DAY

## 2023-12-07 RX ORDER — GUAIFENESIN/DEXTROMETHORPHAN 100-10MG/5
5 SYRUP ORAL
Status: DISCONTINUED | OUTPATIENT
Start: 2023-12-07 | End: 2023-12-09

## 2023-12-07 RX ADMIN — PRAVASTATIN SODIUM 40 MG: 40 TABLET ORAL at 20:21

## 2023-12-07 RX ADMIN — APIXABAN 10 MG: 5 TABLET, FILM COATED ORAL at 18:09

## 2023-12-07 RX ADMIN — GUAIFENESIN AND CODEINE PHOSPHATE 5 ML: 100; 10 SOLUTION ORAL at 00:02

## 2023-12-07 RX ADMIN — Medication 10 ML: at 20:23

## 2023-12-07 RX ADMIN — DIVALPROEX SODIUM 500 MG: 250 TABLET, DELAYED RELEASE ORAL at 20:21

## 2023-12-07 RX ADMIN — GUAIFENESIN AND DEXTROMETHORPHAN 5 ML: 100; 10 SYRUP ORAL at 23:17

## 2023-12-07 RX ADMIN — LAMOTRIGINE 100 MG: 100 TABLET ORAL at 20:21

## 2023-12-07 RX ADMIN — LAMOTRIGINE 100 MG: 100 TABLET ORAL at 09:32

## 2023-12-07 RX ADMIN — OXYBUTYNIN CHLORIDE 5 MG: 5 TABLET ORAL at 09:32

## 2023-12-07 RX ADMIN — GUAIFENESIN 600 MG: 600 TABLET, EXTENDED RELEASE ORAL at 09:32

## 2023-12-07 RX ADMIN — ENOXAPARIN SODIUM 90 MG: 100 INJECTION SUBCUTANEOUS at 06:37

## 2023-12-07 RX ADMIN — DIVALPROEX SODIUM 500 MG: 250 TABLET, DELAYED RELEASE ORAL at 09:32

## 2023-12-07 RX ADMIN — GUAIFENESIN AND DEXTROMETHORPHAN 5 ML: 100; 10 SYRUP ORAL at 16:17

## 2023-12-07 RX ADMIN — GUAIFENESIN AND DEXTROMETHORPHAN 5 ML: 100; 10 SYRUP ORAL at 20:20

## 2023-12-07 RX ADMIN — LEVOTHYROXINE SODIUM 88 MCG: 0.09 TABLET ORAL at 06:37

## 2023-12-07 RX ADMIN — GUAIFENESIN AND CODEINE PHOSPHATE 5 ML: 100; 10 SOLUTION ORAL at 04:05

## 2023-12-07 NOTE — PROGRESS NOTES
[facility] Department   Phone: [phone]    Physical Therapy    [] Initial Evaluation            [x] Daily Treatment Note         [] Discharge Summary      Patient: Rachael Frey   : 1960   MRN: [id number]   Date of Service:  2023  Admitting Diagnosis: Multifocal pneumonia  Current Admission Summary: This is a 66 y.o. female with pertinent past medical history of developmental disability, seizure disorder who was brought in by EMS transportation for cough ongoing for the past 6 days. Patient is nursing home resident, has been receiving NyQuil and DayQuil however today developed fever. Patient also reports sore throat and difficulty breathing. Past Medical History:  has a past medical history of Developmental disability, Eagle-Gutierrez syndrome, Hyperlipidemia, Hypertension, Seizure ([address]), Seizures ([address]), and Thyroid disease. Past Surgical History:  has a past surgical history that includes Thyroid surgery (). Discharge Recommendations: Rachael Frey scored a 13/24 on the AM-PAC short mobility form. Current research shows that an AM-PAC score of 17 or less is typically not associated with a discharge to the patient's home setting. Based on the patient's AM-PAC score and their current functional mobility deficits, it is recommended that the patient have 3-5 sessions per week of Physical Therapy at d/c to increase the patient's independence. Please see assessment section for further patient specific details. If patient discharges prior to next session this note will serve as a discharge summary. Please see below for the latest assessment towards goals.     DME Required For Discharge: DME to be determined at next level of care  Precautions/Restrictions: high fall risk, up as tolerated  Weight Bearing Restrictions: no restrictions     Required Braces/Orthotics: no braces required  Positional Restrictions:no positional restrictions    Pre-Admission

## 2023-12-07 NOTE — PROGRESS NOTES
Nutrition Note    RECOMMENDATIONS  PO Diet: continue regular diet      NUTRITION ASSESSMENT   7 day LOS nutrition assessment. Pt on a regular diet and reports fair po intake. Most meals recorded are greater than 75%. Pt asked about foods to avoid to help prevent blood clots. Pt currently on lovenox for pulmonary emboli. Explained to pt RD will return for education if she is to go home on warfarin. Pt stated understanding. Will follow for education needs. Nutrition Related Findings: 12/4 LBM; flat affect  Wounds: None  Nutrition Education:  No recommendation at this time   Nutrition Goals: PO intake 75% or greater, by next RD assessment     MALNUTRITION ASSESSMENT      Malnutrition Status: No malnutrition      NUTRITION DIAGNOSIS   No nutrition diagnosis at this time       CURRENT NUTRITION THERAPIES  ADULT DIET; Regular; dislikes cooked carrots     PO Intake: 51-75%, %   PO Supplement Intake:None Ordered      ANTHROPOMETRICS  Current Height: 157.5 cm (5' 2.01\")  Current Weight - Scale: 94 kg (207 lb 3.7 oz)    Ideal Body Weight (IBW): 110 lbs  (50 kg)        BMI: 37.9        The patient will be monitored per nutrition standards of care. Consult dietitian if additional nutrition interventions are needed prior to RD reassessment.      Colin Mcleod RD, LD    Contact: [phone]

## 2023-12-07 NOTE — PROGRESS NOTES
[address] [place] [address] Department   Phone: [phone]    Occupational Therapy    [] Initial Evaluation            [x] Daily Treatment Note         [] Discharge Summary      Patient: Galvin Goldmann   : 1960   MRN: [id number]   Date of Service:  2023    Admitting Diagnosis:  Multifocal pneumonia  Current Admission Summary: Galvin Goldmann is a 66 y.o. female who presents with CAP (community acquired pneumonia) due to Chlamydia species   Past Medical History:  has a past medical history of Developmental disability, [place]-Gutierrez syndrome, Hyperlipidemia, Hypertension, Seizure ([address]), Seizures ([address]), and Thyroid disease. Past Surgical History:  has a past surgical history that includes Thyroid surgery (). Discharge Recommendations: Galvin Goldmann scored a 15/24 on the AM-PAC ADL Inpatient form. Current research shows that an AM-PAC score of 17 or less is typically not associated with a discharge to the patient's home setting. Based on the patient's AM-PAC score and their current ADL deficits, it is recommended that the patient have 3-5 sessions per week of Occupational Therapy at d/c to increase the patient's independence. Please see assessment section for further patient specific details. If patient discharges prior to next session this note will serve as a discharge summary. Please see below for the latest assessment towards goals.       DME Required For Discharge: DME to be determined at next level of care    Precautions/Restrictions: high fall risk  Weight Bearing Restrictions: no restrictions  [] Right Upper Extremity  [] Left Upper Extremity [] Right Lower Extremity  [] Left Lower Extremity     Required Braces/Orthotics: no braces required   [] Right  [] Left  Positional Restrictions:no positional restrictions    Pre-Admission Information   Lives With: alone    Type of Home: apartment  Home Layout: one level  Home Access: level entry - up Safety Interventions: patient left in chair, chair alarm in place, call light within reach, and nurse notified    Plan  Frequency: 3-5 x/per week  Current Treatment Recommendations: strengthening, balance training, functional mobility training, transfer training, endurance training, patient/caregiver education, and ADL/self-care training    Goals  Patient Goals: to get stronger    Short Term Goals:  Time Frame: discharge   Patient will complete upper body ADL at minimal assistance   Patient will complete lower body ADL at moderate assistance   Patient will complete toileting at moderate assistance   Patient will complete grooming at stand by assistance   Patient will complete functional transfers at minimal assistance   Patient will complete functional mobility at minimal assistance with LRAD    Above goals reviewed on 12/7/2023. All goals are ongoing at this time unless indicated above.        Therapy Session Time     Individual Group Co-treatment   Time In    7788   Time Out    1209   Minutes    41        Timed Code Treatment Minutes:   41  Total Treatment Minutes:  41       Electronically Signed By: MAIKEL Cruz/IFEANYI [id number]

## 2023-12-07 NOTE — PROGRESS NOTES
Infectious Diseases   Progress Note      Admission Date: 11/29/2023  Hospital Day: Hospital Day: 9   Attending: Bethanie Cotton MD  Date of service: 12/7/2023     Chief complaint/ Reason for consult:     Multifocal pneumonia  Fever on admission  Pulmonary emboli involving the right lower lobe  Essential hypertension    Microbiology:        I have reviewed allavailable micro lab data and cultures    Blood culture (2/2) - collected on 11/29/2023: Negative    Antibiotics and immunizations:       Current antibiotics: All antibiotics and their doses were reviewed by me    Recent Abx Admin        No antibiotic orders with administrations found. Immunization History: All immunization history was reviewed by me today. Immunization History   Administered Date(s) Administered    COVID-19, MODERNA BLUE border, Primary or Immunocompromised, (age 12y+), IM, 100 mcg/0.5mL 02/05/2021, 03/05/2021    COVID-19, PFIZER Bivalent, DO NOT Dilute, (age 12y+), IM, 30 mcg/0.3 mL 09/14/2022       Known drug allergies: All allergies were reviewed and updated    No Known Allergies    Social history:     Social History:  All social andepidemiologic history was reviewed and updated by me today as needed. Tobacco use:   reports that she has never smoked. She does not have any smokeless tobacco history on file. Alcohol use:   reports no history of alcohol use. Currently lives in: [address] [place] [address]   reports no history of drug use.      COVID VACCINATION AND LAB RESULT RECORDS:     Internal Administration   First Dose COVID-19, MODERNA BLUE border, Primary or Immunocompromised, (age 12y+), IM, 100 mcg/0.5mL  02/05/2021   Second Dose COVID-19, MODERNA BLUE border, Primary or Immunocompromised, (age 12y+), IM, 100 mcg/0.5mL   03/05/2021       Last COVID Lab SARS-CoV-2 RNA, RT PCR (no units)   Date Value   11/29/2023 NOT DETECTED            Assessment:     The patient is a 66 y.o. old female who  has a past

## 2023-12-08 LAB
ALBUMIN SERPL-MCNC: 3 G/DL (ref 3.4–5)
ALBUMIN/GLOB SERPL: 1 {RATIO} (ref 1.1–2.2)
ALP SERPL-CCNC: 59 U/L (ref 40–129)
ALT SERPL-CCNC: 16 U/L (ref 10–40)
ANION GAP SERPL CALCULATED.3IONS-SCNC: 5 MMOL/L (ref 3–16)
AST SERPL-CCNC: 15 U/L (ref 15–37)
BILIRUB SERPL-MCNC: 0.4 MG/DL (ref 0–1)
BUN SERPL-MCNC: 18 MG/DL (ref 7–20)
CALCIUM SERPL-MCNC: 9 MG/DL (ref 8.3–10.6)
CHLORIDE SERPL-SCNC: 103 MMOL/L (ref 99–110)
CO2 SERPL-SCNC: 33 MMOL/L (ref 21–32)
CREAT SERPL-MCNC: 0.6 MG/DL (ref 0.6–1.2)
DEPRECATED RDW RBC AUTO: 14.9 % (ref 12.4–15.4)
GFR SERPLBLD CREATININE-BSD FMLA CKD-EPI: >60 ML/MIN/{1.73_M2}
GLUCOSE SERPL-MCNC: 84 MG/DL (ref 70–99)
HCT VFR BLD AUTO: 35.3 % (ref 36–48)
HGB BLD-MCNC: 12.5 G/DL (ref 12–16)
MAGNESIUM SERPL-MCNC: 2.1 MG/DL (ref 1.8–2.4)
MCH RBC QN AUTO: 34.8 PG (ref 26–34)
MCHC RBC AUTO-ENTMCNC: 35.5 G/DL (ref 31–36)
MCV RBC AUTO: 98.1 FL (ref 80–100)
PHOSPHATE SERPL-MCNC: 3.2 MG/DL (ref 2.5–4.9)
PLATELET # BLD AUTO: 245 K/UL (ref 135–450)
PMV BLD AUTO: 7.6 FL (ref 5–10.5)
POTASSIUM SERPL-SCNC: 4.4 MMOL/L (ref 3.5–5.1)
PROT SERPL-MCNC: 5.9 G/DL (ref 6.4–8.2)
RBC # BLD AUTO: 3.6 M/UL (ref 4–5.2)
SODIUM SERPL-SCNC: 141 MMOL/L (ref 136–145)
WBC # BLD AUTO: 7 K/UL (ref 4–11)

## 2023-12-08 PROCEDURE — 6370000000 HC RX 637 (ALT 250 FOR IP): Performed by: NURSE PRACTITIONER

## 2023-12-08 PROCEDURE — 2580000003 HC RX 258: Performed by: NURSE PRACTITIONER

## 2023-12-08 PROCEDURE — 36415 COLL VENOUS BLD VENIPUNCTURE: CPT

## 2023-12-08 PROCEDURE — 80053 COMPREHEN METABOLIC PANEL: CPT

## 2023-12-08 PROCEDURE — 1200000000 HC SEMI PRIVATE

## 2023-12-08 PROCEDURE — 97530 THERAPEUTIC ACTIVITIES: CPT

## 2023-12-08 PROCEDURE — 97535 SELF CARE MNGMENT TRAINING: CPT

## 2023-12-08 PROCEDURE — 6370000000 HC RX 637 (ALT 250 FOR IP): Performed by: INTERNAL MEDICINE

## 2023-12-08 PROCEDURE — 83735 ASSAY OF MAGNESIUM: CPT

## 2023-12-08 PROCEDURE — 2700000000 HC OXYGEN THERAPY PER DAY

## 2023-12-08 PROCEDURE — 84100 ASSAY OF PHOSPHORUS: CPT

## 2023-12-08 PROCEDURE — 6370000000 HC RX 637 (ALT 250 FOR IP): Performed by: HOSPITALIST

## 2023-12-08 PROCEDURE — 85027 COMPLETE CBC AUTOMATED: CPT

## 2023-12-08 PROCEDURE — 97116 GAIT TRAINING THERAPY: CPT

## 2023-12-08 RX ADMIN — LEVOTHYROXINE SODIUM 88 MCG: 0.09 TABLET ORAL at 05:17

## 2023-12-08 RX ADMIN — LAMOTRIGINE 100 MG: 100 TABLET ORAL at 08:39

## 2023-12-08 RX ADMIN — OXYBUTYNIN CHLORIDE 5 MG: 5 TABLET ORAL at 08:39

## 2023-12-08 RX ADMIN — APIXABAN 10 MG: 5 TABLET, FILM COATED ORAL at 20:55

## 2023-12-08 RX ADMIN — Medication 10 ML: at 22:22

## 2023-12-08 RX ADMIN — CALCIUM 500 MG: 500 TABLET ORAL at 08:39

## 2023-12-08 RX ADMIN — DIVALPROEX SODIUM 500 MG: 250 TABLET, DELAYED RELEASE ORAL at 20:55

## 2023-12-08 RX ADMIN — DIVALPROEX SODIUM 500 MG: 250 TABLET, DELAYED RELEASE ORAL at 08:39

## 2023-12-08 RX ADMIN — GUAIFENESIN AND DEXTROMETHORPHAN 5 ML: 100; 10 SYRUP ORAL at 03:50

## 2023-12-08 RX ADMIN — GUAIFENESIN AND DEXTROMETHORPHAN 5 ML: 100; 10 SYRUP ORAL at 17:20

## 2023-12-08 RX ADMIN — PRAVASTATIN SODIUM 40 MG: 40 TABLET ORAL at 20:54

## 2023-12-08 RX ADMIN — GUAIFENESIN AND DEXTROMETHORPHAN 5 ML: 100; 10 SYRUP ORAL at 20:54

## 2023-12-08 RX ADMIN — LAMOTRIGINE 100 MG: 100 TABLET ORAL at 20:55

## 2023-12-08 RX ADMIN — GUAIFENESIN AND DEXTROMETHORPHAN 5 ML: 100; 10 SYRUP ORAL at 14:16

## 2023-12-08 RX ADMIN — APIXABAN 10 MG: 5 TABLET, FILM COATED ORAL at 08:39

## 2023-12-08 RX ADMIN — GUAIFENESIN AND DEXTROMETHORPHAN 5 ML: 100; 10 SYRUP ORAL at 08:39

## 2023-12-08 NOTE — PROGRESS NOTES
[address] [facility] Department   Phone: [phone]    Physical Therapy    [] Initial Evaluation            [x] Daily Treatment Note         [] Discharge Summary      Patient: Radha Albert   : 1960   MRN: [id number]   Date of Service:  2023  Admitting Diagnosis: Multifocal pneumonia  Current Admission Summary: This is a 66 y.o. female with pertinent past medical history of developmental disability, seizure disorder who was brought in by EMS transportation for cough ongoing for the past 6 days. Patient is nursing home resident, has been receiving NyQuil and DayQuil however today developed fever. Patient also reports sore throat and difficulty breathing. Past Medical History:  has a past medical history of Developmental disability, Eagle-Gutierrez syndrome, Hyperlipidemia, Hypertension, Seizure ([address]), Seizures ([address]), and Thyroid disease. Past Surgical History:  has a past surgical history that includes Thyroid surgery (). Discharge Recommendations: Radha Albert scored a 13/24 on the AM-PAC short mobility form. Current research shows that an AM-PAC score of 17 or less is typically not associated with a discharge to the patient's home setting. Based on the patient's AM-PAC score and their current functional mobility deficits, it is recommended that the patient have 3-5 sessions per week of Physical Therapy at d/c to increase the patient's independence. Please see assessment section for further patient specific details. If patient discharges prior to next session this note will serve as a discharge summary. Please see below for the latest assessment towards goals.     DME Required For Discharge: DME to be determined at next level of care  Precautions/Restrictions: high fall risk, up as tolerated  Weight Bearing Restrictions: no restrictions     Required Braces/Orthotics: no braces required  Positional Restrictions:no positional restrictions    Pre-Admission

## 2023-12-08 NOTE — PROGRESS NOTES
V2.0    [facility] [place] Progress Note      Name:  Dale Olivares /Age/Sex: 1960  (66 y.o. female)   MRN & CSN:  [id number] & [id number] Encounter Date/Time: 2023 7:39 AM EST   Location:  /3938-39 PCP: Durwood Oppenheim, MD     Attending:Rafaela [facility] MD LYLY       Hospital Day: 10    Assessment and Recommendations   Dale Olivares is a 66 y.o. female who presented with cough and shortness of breath. Plan:   Acute hypoxic respiratory failure due to pulmonary emboli:  CT chest multiple acute right lower lobe pulmonary emboli and multifocal infiltrates. Multifocal infiltrates most likely pulmonary infarcts. Venous ultrasound with bilateral DVT. Started on Eliquis  Respiratory viral panel, urine strep and Legionella antigen negative. Blood cultures NGTD. Completed 7 days of IV antibiotics. Heme consult appreciated. ID consulted: Recommended monitoring off antibiotics. Wean off supplemental oxygen as tolerated. Hypertension: Now with relative hypotension. Monitor off HCTZ. Seizures: Continue Depakote. Seizure precautions. [place]-Gutierrez Syndrome       Diet ADULT DIET; Regular; Dislikes cooked carrots & coffee. Prefers hot chocolate & orange juice. DVT Prophylaxis Therapeutic Lovenox   Code Status Full Code   Disposition SNF recommended         Personally reviewed Lab Studies and Imaging         Subjective:     Chief Complaint:  cough, shortness of breath    Patient seen and examined. Persistent cough, dyspnea resolving  No chest pain, fever or chills. Currently requiring 3 L nasal cannula     Review of Systems:      Pertinent positives and negatives discussed in HPI    Objective:      Intake/Output Summary (Last 24 hours) at 2023 1627  Last data filed at 2023 0356  Gross per 24 hour   Intake 240 ml   Output 900 ml   Net -660 ml        Vitals:   Vitals:    23 2315 23 0345 23 0815 23 1415   BP: (!) 141/67 134/63 (!) 96/48 (!) 99/46   Pulse: 68 63

## 2023-12-08 NOTE — PROGRESS NOTES
[address] [place] [address] Department   Phone: [phone]    Occupational Therapy    [] Initial Evaluation            [x] Daily Treatment Note         [] Discharge Summary      Patient: Kati Cruz   : 1960   MRN: [id number]   Date of Service:  2023    Admitting Diagnosis:  Multifocal pneumonia  Current Admission Summary: Kati Cruz is a 66 y.o. female who presents with CAP (community acquired pneumonia) due to Chlamydia species   Past Medical History:  has a past medical history of Developmental disability, Eagle-Gutierrez syndrome, Hyperlipidemia, Hypertension, Seizure ([address]), Seizures ([address]), and Thyroid disease. Past Surgical History:  has a past surgical history that includes Thyroid surgery (). Discharge Recommendations: Kati Cruz scored a 15/24 on the AM-PAC ADL Inpatient form. Current research shows that an AM-PAC score of 17 or less is typically not associated with a discharge to the patient's home setting. Based on the patient's AM-PAC score and their current ADL deficits, it is recommended that the patient have 3-5 sessions per week of Occupational Therapy at d/c to increase the patient's independence. Please see assessment section for further patient specific details. If patient discharges prior to next session this note will serve as a discharge summary. Please see below for the latest assessment towards goals.       DME Required For Discharge: DME to be determined at next level of care    Precautions/Restrictions: high fall risk  Weight Bearing Restrictions: no restrictions  [] Right Upper Extremity  [] Left Upper Extremity [] Right Lower Extremity  [] Left Lower Extremity     Required Braces/Orthotics: no braces required   [] Right  [] Left  Positional Restrictions:no positional restrictions    Pre-Admission Information   Lives With: alone    Type of Home: apartment  Home Layout: one level  Home Access: level entry - up

## 2023-12-09 PROCEDURE — 2580000003 HC RX 258: Performed by: NURSE PRACTITIONER

## 2023-12-09 PROCEDURE — 6370000000 HC RX 637 (ALT 250 FOR IP): Performed by: INTERNAL MEDICINE

## 2023-12-09 PROCEDURE — 6370000000 HC RX 637 (ALT 250 FOR IP): Performed by: NURSE PRACTITIONER

## 2023-12-09 PROCEDURE — 94761 N-INVAS EAR/PLS OXIMETRY MLT: CPT

## 2023-12-09 PROCEDURE — 6360000002 HC RX W HCPCS: Performed by: INTERNAL MEDICINE

## 2023-12-09 PROCEDURE — 1200000000 HC SEMI PRIVATE

## 2023-12-09 PROCEDURE — 94640 AIRWAY INHALATION TREATMENT: CPT

## 2023-12-09 PROCEDURE — 6370000000 HC RX 637 (ALT 250 FOR IP): Performed by: HOSPITALIST

## 2023-12-09 PROCEDURE — 94669 MECHANICAL CHEST WALL OSCILL: CPT

## 2023-12-09 RX ORDER — ALBUTEROL SULFATE 2.5 MG/3ML
2.5 SOLUTION RESPIRATORY (INHALATION)
Status: DISCONTINUED | OUTPATIENT
Start: 2023-12-10 | End: 2023-12-11

## 2023-12-09 RX ORDER — ALBUTEROL SULFATE 2.5 MG/3ML
2.5 SOLUTION RESPIRATORY (INHALATION) EVERY 6 HOURS PRN
Status: DISCONTINUED | OUTPATIENT
Start: 2023-12-09 | End: 2023-12-13 | Stop reason: HOSPADM

## 2023-12-09 RX ORDER — GUAIFENESIN/DEXTROMETHORPHAN 100-10MG/5
10 SYRUP ORAL
Status: DISCONTINUED | OUTPATIENT
Start: 2023-12-09 | End: 2023-12-10

## 2023-12-09 RX ADMIN — PRAVASTATIN SODIUM 40 MG: 40 TABLET ORAL at 20:23

## 2023-12-09 RX ADMIN — LEVOTHYROXINE SODIUM 88 MCG: 0.09 TABLET ORAL at 06:19

## 2023-12-09 RX ADMIN — APIXABAN 10 MG: 5 TABLET, FILM COATED ORAL at 20:22

## 2023-12-09 RX ADMIN — DIVALPROEX SODIUM 500 MG: 250 TABLET, DELAYED RELEASE ORAL at 10:00

## 2023-12-09 RX ADMIN — BENZONATATE 200 MG: 100 CAPSULE ORAL at 22:11

## 2023-12-09 RX ADMIN — ALBUTEROL SULFATE 2.5 MG: 2.5 SOLUTION RESPIRATORY (INHALATION) at 10:09

## 2023-12-09 RX ADMIN — GUAIFENESIN AND DEXTROMETHORPHAN 5 ML: 100; 10 SYRUP ORAL at 09:55

## 2023-12-09 RX ADMIN — GUAIFENESIN AND DEXTROMETHORPHAN 10 ML: 100; 10 SYRUP ORAL at 23:59

## 2023-12-09 RX ADMIN — ALBUTEROL SULFATE 2.5 MG: 2.5 SOLUTION RESPIRATORY (INHALATION) at 19:58

## 2023-12-09 RX ADMIN — GUAIFENESIN AND DEXTROMETHORPHAN 5 ML: 100; 10 SYRUP ORAL at 00:14

## 2023-12-09 RX ADMIN — DIVALPROEX SODIUM 500 MG: 250 TABLET, DELAYED RELEASE ORAL at 20:23

## 2023-12-09 RX ADMIN — OXYBUTYNIN CHLORIDE 5 MG: 5 TABLET ORAL at 09:55

## 2023-12-09 RX ADMIN — LAMOTRIGINE 100 MG: 100 TABLET ORAL at 09:55

## 2023-12-09 RX ADMIN — Medication 10 ML: at 09:54

## 2023-12-09 RX ADMIN — Medication 10 ML: at 20:25

## 2023-12-09 RX ADMIN — MELATONIN TAB 3 MG 3 MG: 3 TAB at 20:22

## 2023-12-09 RX ADMIN — ALBUTEROL SULFATE 2.5 MG: 2.5 SOLUTION RESPIRATORY (INHALATION) at 16:41

## 2023-12-09 RX ADMIN — GUAIFENESIN AND DEXTROMETHORPHAN 10 ML: 100; 10 SYRUP ORAL at 20:27

## 2023-12-09 RX ADMIN — CALCIUM 500 MG: 500 TABLET ORAL at 09:55

## 2023-12-09 RX ADMIN — GUAIFENESIN AND DEXTROMETHORPHAN 5 ML: 100; 10 SYRUP ORAL at 04:28

## 2023-12-09 RX ADMIN — APIXABAN 10 MG: 5 TABLET, FILM COATED ORAL at 09:55

## 2023-12-09 RX ADMIN — GUAIFENESIN AND DEXTROMETHORPHAN 10 ML: 100; 10 SYRUP ORAL at 12:44

## 2023-12-09 RX ADMIN — GUAIFENESIN AND DEXTROMETHORPHAN 10 ML: 100; 10 SYRUP ORAL at 17:21

## 2023-12-09 RX ADMIN — LAMOTRIGINE 100 MG: 100 TABLET ORAL at 20:23

## 2023-12-09 NOTE — PROGRESS NOTES
V2.0    [facility] Progress Note      Name:  Carloz Wright /Age/Sex: 1960  (66 y.o. female)   MRN & CSN:  [id number] & [id number] Encounter Date/Time: 2023 7:39 AM EST   Location:  [address] [place] [address]/9848-33 PCP: Abdiaziz Gage MD     Attending:Radha Russo, [address] [place] [address] Day: 11    Assessment and Recommendations   Carloz Wright is a 66 y.o. female who presented with cough and shortness of breath. Plan:   Acute hypoxic respiratory failure due to pulmonary emboli:  CT chest multiple acute right lower lobe pulmonary emboli and multifocal infiltrates. Multifocal infiltrates most likely pulmonary infarcts. Venous ultrasound with bilateral DVT. Started on Eliquis  Respiratory viral panel, urine strep and Legionella antigen negative. Blood cultures NGTD. Completed 7 days of IV antibiotics. Heme consult appreciated. ID consulted: Recommended monitoring off antibiotics. Wean off supplemental oxygen as tolerated. Hypertension: Now with relative hypotension. Monitor off HCTZ. Seizures: Continue Depakote. Seizure precautions. [place]-Gutierrez Syndrome       Diet ADULT DIET; Regular; Dislikes cooked carrots & coffee. Prefers hot chocolate & orange juice. DVT Prophylaxis Therapeutic Lovenox   Code Status Full Code   Disposition SNF recommended         Personally reviewed Lab Studies and Imaging         Subjective:     Chief Complaint:  cough, shortness of breath    Patient seen and examined. Persistent cough ++, dyspnea resolving  No chest pain, fever or chills. Currently requiring 1-2 L nasal cannula     Review of Systems:      Pertinent positives and negatives discussed in HPI    Objective:      Intake/Output Summary (Last 24 hours) at 2023 1138  Last data filed at 2023 0624  Gross per 24 hour   Intake --   Output 300 ml   Net -300 ml        Vitals:   Vitals:    23 0015 23 0408 23 0945 23 1013   BP: (!) 146/97 112/60 112/87    Pulse: 63 55 71    Resp:

## 2023-12-09 NOTE — PROGRESS NOTES
12/09/23 1654   RT Protocol   History Pulmonary Disease 0   Respiratory pattern 2   Breath sounds 2   Cough 0   Bronchodilator Assessment Score 4

## 2023-12-09 NOTE — PROGRESS NOTES
Shift assessment complete, VSS, Oxygen 91% at 1L. Pt A&Ox4 in bed, denies any pain at this time, c/o cough. Scheduled med given per [facility], POC and education reviewed with the pt. Safety measures in place. All needs met at this time, call light in reach, will continue to monitor.

## 2023-12-09 NOTE — PROGRESS NOTES
12/09/23 1021   Treatment   Treatment Type Cough assist;Vibratory mucous clearing therapy or intervention     Cough assist , acapella and HHN Albuterol completed per request from Dr. Percy Darby. Patient tolerated well.

## 2023-12-10 ENCOUNTER — APPOINTMENT (OUTPATIENT)
Dept: GENERAL RADIOLOGY | Age: 63
DRG: 175 | End: 2023-12-10
Payer: MEDICARE

## 2023-12-10 LAB
CRP SERPL-MCNC: 11.4 MG/L (ref 0–5.1)
PROCALCITONIN SERPL IA-MCNC: 0.05 NG/ML (ref 0–0.15)
REPORT: NORMAL
RESP PATH DNA+RNA PNL NPH NAA+NON-PROBE: NORMAL

## 2023-12-10 PROCEDURE — 2700000000 HC OXYGEN THERAPY PER DAY

## 2023-12-10 PROCEDURE — 6370000000 HC RX 637 (ALT 250 FOR IP): Performed by: HOSPITALIST

## 2023-12-10 PROCEDURE — 6370000000 HC RX 637 (ALT 250 FOR IP): Performed by: NURSE PRACTITIONER

## 2023-12-10 PROCEDURE — 1200000000 HC SEMI PRIVATE

## 2023-12-10 PROCEDURE — 84145 PROCALCITONIN (PCT): CPT

## 2023-12-10 PROCEDURE — 6360000002 HC RX W HCPCS: Performed by: INTERNAL MEDICINE

## 2023-12-10 PROCEDURE — 2580000003 HC RX 258: Performed by: NURSE PRACTITIONER

## 2023-12-10 PROCEDURE — 6370000000 HC RX 637 (ALT 250 FOR IP): Performed by: INTERNAL MEDICINE

## 2023-12-10 PROCEDURE — 94640 AIRWAY INHALATION TREATMENT: CPT

## 2023-12-10 PROCEDURE — 0202U NFCT DS 22 TRGT SARS-COV-2: CPT

## 2023-12-10 PROCEDURE — 94761 N-INVAS EAR/PLS OXIMETRY MLT: CPT

## 2023-12-10 PROCEDURE — 71045 X-RAY EXAM CHEST 1 VIEW: CPT

## 2023-12-10 PROCEDURE — 36415 COLL VENOUS BLD VENIPUNCTURE: CPT

## 2023-12-10 PROCEDURE — 86140 C-REACTIVE PROTEIN: CPT

## 2023-12-10 RX ORDER — GUAIFENESIN 600 MG/1
600 TABLET, EXTENDED RELEASE ORAL 2 TIMES DAILY
Status: DISCONTINUED | OUTPATIENT
Start: 2023-12-10 | End: 2023-12-13 | Stop reason: HOSPADM

## 2023-12-10 RX ADMIN — DIVALPROEX SODIUM 500 MG: 250 TABLET, DELAYED RELEASE ORAL at 22:38

## 2023-12-10 RX ADMIN — Medication 10 ML: at 22:41

## 2023-12-10 RX ADMIN — ALBUTEROL SULFATE 2.5 MG: 2.5 SOLUTION RESPIRATORY (INHALATION) at 20:46

## 2023-12-10 RX ADMIN — GUAIFENESIN 600 MG: 600 TABLET, EXTENDED RELEASE ORAL at 22:38

## 2023-12-10 RX ADMIN — APIXABAN 10 MG: 5 TABLET, FILM COATED ORAL at 22:39

## 2023-12-10 RX ADMIN — DIVALPROEX SODIUM 500 MG: 250 TABLET, DELAYED RELEASE ORAL at 09:20

## 2023-12-10 RX ADMIN — Medication 10 ML: at 09:08

## 2023-12-10 RX ADMIN — LAMOTRIGINE 100 MG: 100 TABLET ORAL at 09:09

## 2023-12-10 RX ADMIN — OXYBUTYNIN CHLORIDE 5 MG: 5 TABLET ORAL at 09:08

## 2023-12-10 RX ADMIN — GUAIFENESIN AND DEXTROMETHORPHAN 10 ML: 100; 10 SYRUP ORAL at 09:08

## 2023-12-10 RX ADMIN — GUAIFENESIN 600 MG: 600 TABLET, EXTENDED RELEASE ORAL at 11:00

## 2023-12-10 RX ADMIN — LAMOTRIGINE 100 MG: 100 TABLET ORAL at 22:40

## 2023-12-10 RX ADMIN — ALBUTEROL SULFATE 2.5 MG: 2.5 SOLUTION RESPIRATORY (INHALATION) at 12:02

## 2023-12-10 RX ADMIN — CALCIUM 500 MG: 500 TABLET ORAL at 09:08

## 2023-12-10 RX ADMIN — APIXABAN 10 MG: 5 TABLET, FILM COATED ORAL at 09:08

## 2023-12-10 RX ADMIN — PRAVASTATIN SODIUM 40 MG: 40 TABLET ORAL at 22:40

## 2023-12-10 RX ADMIN — BENZONATATE 200 MG: 100 CAPSULE ORAL at 22:45

## 2023-12-10 RX ADMIN — LEVOTHYROXINE SODIUM 88 MCG: 0.09 TABLET ORAL at 06:06

## 2023-12-10 NOTE — PROGRESS NOTES
Shift assessment complete. Meds given per eMAR. Pt has delayed responses. Cough helped with tessalon. Purwick in place. Will continue to monitor. The care plan and education has been reviewed and mutually agreed upon with the patient.    Electronically signed by Mary Ann Guthrie RN on 12/10/2023 at 12:43 AM

## 2023-12-10 NOTE — PLAN OF CARE
Problem: Discharge Planning  Goal: Discharge to home or other facility with appropriate resources  12/10/2023 1119 by Scott Davis RN  Outcome: Progressing  12/9/2023 2318 by Marcel Caballero RN  Outcome: Progressing     Problem: Pain  Goal: Verbalizes/displays adequate comfort level or baseline comfort level  12/10/2023 1119 by Scott Davis RN  Outcome: Progressing  12/9/2023 2318 by Marcel Caballero RN  Outcome: Progressing     Problem: Skin/Tissue Integrity  Goal: Absence of new skin breakdown  Description: 1. Monitor for areas of redness and/or skin breakdown  2. Assess vascular access sites hourly  3. Every 4-6 hours minimum:  Change oxygen saturation probe site  4. Every 4-6 hours:  If on nasal continuous positive airway pressure, respiratory therapy assess nares and determine need for appliance change or resting period.   12/10/2023 1119 by Scott Davis RN  Outcome: Progressing  12/9/2023 2318 by Marcel Caballero RN  Outcome: Progressing     Problem: Safety - Adult  Goal: Free from fall injury  12/10/2023 1119 by Scott Davis RN  Outcome: Progressing  12/9/2023 2318 by Marcel Caballero RN  Outcome: Progressing     Problem: ABCDS Injury Assessment  Goal: Absence of physical injury  12/10/2023 1119 by Scott Davis RN  Outcome: Progressing  12/9/2023 2318 by Marcel Caballero RN  Outcome: Progressing

## 2023-12-10 NOTE — PROGRESS NOTES
Shift assessment complete, HR 58, oxygen 92% in 2L, pt A&Ox4 in bed, still coughing, denies any pain at this time. Scheduled med given per [facility], POC and education reviewed with the pt. Safety precautions in place. All needs met at this time, call light in reach, will continue to monitor. 1358: Pulmonology consulted per order to Dr. Pamela Curry.

## 2023-12-10 NOTE — PLAN OF CARE
Problem: Discharge Planning  Goal: Discharge to home or other facility with appropriate resources  12/9/2023 2318 by Suzan Holland RN  Outcome: Progressing  12/9/2023 1133 by Khurram Velazquez RN  Outcome: Progressing     Problem: Pain  Goal: Verbalizes/displays adequate comfort level or baseline comfort level  12/9/2023 2318 by Suzan Holland RN  Outcome: Progressing  12/9/2023 1133 by Khurram Velazquez RN  Outcome: Progressing     Problem: Skin/Tissue Integrity  Goal: Absence of new skin breakdown  Description: 1. Monitor for areas of redness and/or skin breakdown  2. Assess vascular access sites hourly  3. Every 4-6 hours minimum:  Change oxygen saturation probe site  4. Every 4-6 hours:  If on nasal continuous positive airway pressure, respiratory therapy assess nares and determine need for appliance change or resting period.   12/9/2023 2318 by Suzan Holland RN  Outcome: Progressing  12/9/2023 1133 by Khurram Velazquez RN  Outcome: Progressing     Problem: Safety - Adult  Goal: Free from fall injury  12/9/2023 2318 by Suzan Holland RN  Outcome: Progressing  12/9/2023 1133 by Khurram Velazquez RN  Outcome: Progressing     Problem: ABCDS Injury Assessment  Goal: Absence of physical injury  12/9/2023 2318 by Suzan Holland RN  Outcome: Progressing  12/9/2023 1133 by Khurram Velazquez RN  Outcome: Progressing

## 2023-12-11 PROBLEM — R05.3 PERSISTENT COUGH: Status: ACTIVE | Noted: 2023-12-11

## 2023-12-11 PROCEDURE — 94761 N-INVAS EAR/PLS OXIMETRY MLT: CPT

## 2023-12-11 PROCEDURE — 2700000000 HC OXYGEN THERAPY PER DAY

## 2023-12-11 PROCEDURE — 97530 THERAPEUTIC ACTIVITIES: CPT

## 2023-12-11 PROCEDURE — 6370000000 HC RX 637 (ALT 250 FOR IP): Performed by: NURSE PRACTITIONER

## 2023-12-11 PROCEDURE — 6370000000 HC RX 637 (ALT 250 FOR IP): Performed by: HOSPITALIST

## 2023-12-11 PROCEDURE — 6370000000 HC RX 637 (ALT 250 FOR IP): Performed by: INTERNAL MEDICINE

## 2023-12-11 PROCEDURE — 6360000002 HC RX W HCPCS: Performed by: INTERNAL MEDICINE

## 2023-12-11 PROCEDURE — 97110 THERAPEUTIC EXERCISES: CPT

## 2023-12-11 PROCEDURE — 2580000003 HC RX 258: Performed by: NURSE PRACTITIONER

## 2023-12-11 PROCEDURE — 1200000000 HC SEMI PRIVATE

## 2023-12-11 PROCEDURE — 94669 MECHANICAL CHEST WALL OSCILL: CPT

## 2023-12-11 PROCEDURE — 99223 1ST HOSP IP/OBS HIGH 75: CPT | Performed by: INTERNAL MEDICINE

## 2023-12-11 PROCEDURE — 94640 AIRWAY INHALATION TREATMENT: CPT

## 2023-12-11 RX ORDER — ALBUTEROL SULFATE 2.5 MG/3ML
2.5 SOLUTION RESPIRATORY (INHALATION)
Status: DISCONTINUED | OUTPATIENT
Start: 2023-12-11 | End: 2023-12-13 | Stop reason: HOSPADM

## 2023-12-11 RX ORDER — GUAIFENESIN 600 MG/1
600 TABLET, EXTENDED RELEASE ORAL 2 TIMES DAILY
Qty: 20 TABLET | Refills: 0 | Status: CANCELLED
Start: 2023-12-11

## 2023-12-11 RX ADMIN — LAMOTRIGINE 100 MG: 100 TABLET ORAL at 11:03

## 2023-12-11 RX ADMIN — OXYBUTYNIN CHLORIDE 5 MG: 5 TABLET ORAL at 11:04

## 2023-12-11 RX ADMIN — ALBUTEROL SULFATE 2.5 MG: 2.5 SOLUTION RESPIRATORY (INHALATION) at 19:58

## 2023-12-11 RX ADMIN — Medication 10 ML: at 11:06

## 2023-12-11 RX ADMIN — DIVALPROEX SODIUM 500 MG: 250 TABLET, DELAYED RELEASE ORAL at 11:03

## 2023-12-11 RX ADMIN — GUAIFENESIN 600 MG: 600 TABLET, EXTENDED RELEASE ORAL at 23:05

## 2023-12-11 RX ADMIN — DIVALPROEX SODIUM 500 MG: 250 TABLET, DELAYED RELEASE ORAL at 23:04

## 2023-12-11 RX ADMIN — ALBUTEROL SULFATE 2.5 MG: 2.5 SOLUTION RESPIRATORY (INHALATION) at 11:57

## 2023-12-11 RX ADMIN — LEVOTHYROXINE SODIUM 88 MCG: 0.09 TABLET ORAL at 11:05

## 2023-12-11 RX ADMIN — ALBUTEROL SULFATE 2.5 MG: 2.5 SOLUTION RESPIRATORY (INHALATION) at 08:27

## 2023-12-11 RX ADMIN — GUAIFENESIN 600 MG: 600 TABLET, EXTENDED RELEASE ORAL at 11:04

## 2023-12-11 RX ADMIN — BENZONATATE 200 MG: 100 CAPSULE ORAL at 11:05

## 2023-12-11 RX ADMIN — Medication 10 ML: at 23:04

## 2023-12-11 RX ADMIN — LAMOTRIGINE 100 MG: 100 TABLET ORAL at 23:03

## 2023-12-11 RX ADMIN — ALBUTEROL SULFATE 2.5 MG: 2.5 SOLUTION RESPIRATORY (INHALATION) at 15:27

## 2023-12-11 RX ADMIN — APIXABAN 10 MG: 5 TABLET, FILM COATED ORAL at 11:03

## 2023-12-11 RX ADMIN — APIXABAN 10 MG: 5 TABLET, FILM COATED ORAL at 23:04

## 2023-12-11 RX ADMIN — PRAVASTATIN SODIUM 40 MG: 40 TABLET ORAL at 23:04

## 2023-12-11 RX ADMIN — CALCIUM 500 MG: 500 TABLET ORAL at 11:03

## 2023-12-11 NOTE — PROGRESS NOTES
2234: Shift assessment completed. VSS. Medications given per MAR. Slower to respond to questions. Alert and oriented x4, still coughing. Pt denies any further needs at this time. Bedside table and call light within reach, bed in lowest position. The care plan and education has been reviewed and mutually agreed upon with the patient.      Pablo Davidson RN

## 2023-12-11 NOTE — PLAN OF CARE
Problem: Discharge Planning  Goal: Discharge to home or other facility with appropriate resources  12/11/2023 1435 by Gladys Mendenhall RN  Outcome: Progressing  12/11/2023 1435 by Gladys Mendenhall RN  Outcome: Progressing  12/11/2023 0201 by Harshal Jackson RN  Outcome: Progressing     Problem: Pain  Goal: Verbalizes/displays adequate comfort level or baseline comfort level  12/11/2023 1435 by Gladys Mendenhall RN  Outcome: Progressing  12/11/2023 1435 by Gladys Mendenhall RN  Outcome: Progressing  12/11/2023 0201 by Harshal Jackson RN  Outcome: Progressing     Problem: Skin/Tissue Integrity  Goal: Absence of new skin breakdown  Description: 1. Monitor for areas of redness and/or skin breakdown  2. Assess vascular access sites hourly  3. Every 4-6 hours minimum:  Change oxygen saturation probe site  4. Every 4-6 hours:  If on nasal continuous positive airway pressure, respiratory therapy assess nares and determine need for appliance change or resting period.   12/11/2023 1435 by Gladys Mendenhall RN  Outcome: Progressing  12/11/2023 1435 by Gladys Mendenhall RN  Outcome: Progressing     Problem: Safety - Adult  Goal: Free from fall injury  12/11/2023 1435 by Gladys Mendenhall RN  Outcome: Progressing  12/11/2023 1435 by Gladys Mendenhall RN  Outcome: Progressing  12/11/2023 0201 by Harshal Jackson RN  Outcome: Progressing     Problem: ABCDS Injury Assessment  Goal: Absence of physical injury  12/11/2023 1435 by Gladys Mendenhall RN  Outcome: Progressing  12/11/2023 1435 by Gladys Mendenhall RN  Outcome: Progressing

## 2023-12-11 NOTE — CARE COORDINATION
Discharge Planning Note:    Pulmonology would like to to have [place]-neb Tx.  has called [facility] to see if they have the ability to administer [place]-neb Tx. LV with request for call back.     Electronically signed by Guevara Layne RN on 12/11/2023 at 2:43 PM

## 2023-12-11 NOTE — PROGRESS NOTES
Physician Progress Note      PATIENT:               Kati Cruz  CSN #:                  [id number]  :                       1960  ADMIT DATE:       2023 5:20 PM  [facility] DATE:  RESPONDING  PROVIDER #:        Jenn Alfaro MD          QUERY TEXT:    Pt admitted with pneumonia. Pt noted to have \"CT chest multiple acute right   lower lobe pulmonary emboli and multifocal bilateral pneumonia\" on . If   possible, please document in progress notes and discharge summary the present   on admission status of pulmonary embolisms:    The medical record reflects the following:  Risk Factors: bilateral DVT's, pneumonia  Clinical Indicators: \"Multiple acute right lower lobe pulmonary emboli\" noted   per CT on ,  \"CT chest multiple acute right lower lobe pulmonary emboli   and multifocal infiltrates. Multifocal infiltrates most likely pulmonary   infarcts. \" noted on   Treatment: CT, venous ultrasound, Eliquis, completed 7 days of antibiotics,   supportive care    Thank you,  Sheela Hodge RN, [email or web address]  Options provided:  -- Yes, pulmonary embolisms were likely present at the time of the order to   admit to the hospital  -- No, pulmonary embolisms was not present on admission and developed during   the inpatient stay  -- Other - I will add my own diagnosis  -- Disagree - Not applicable / Not valid  -- Disagree - Clinically unable to determine / Unknown  -- Refer to Clinical Documentation Reviewer    PROVIDER RESPONSE TEXT:    Yes, pulmonary embolisms were likely present at the time of the order to admit   to the hospital.    Query created by: Sheela Hodge on 2023 12:14 PM      Electronically signed by:  Jenn Alfaro MD 2023 3:24 PM

## 2023-12-11 NOTE — PROGRESS NOTES
2:35 PM  Patient bathed and hair care provided. Patient up to chair for meals. Denies pain, nausea or vomiting. PRN given for frequent cough. Patient tolerating her diet and is voiding via purwick without difficulty. VSS. Patient stable and denied needs when writer left room.

## 2023-12-11 NOTE — CARE COORDINATION
Discharge Planning Note:      IVIS spoke to Belle at [facility] to see if they can administer [place]-nebs. Eliseo Wheeler believes they can but have to rent the machine from their DME supplier. Eliseo Wheeler will check with her supplier but will need to know the frequency of Tx and the amount of time pt will need the [place]-neb.      Electronically signed by Subha Jameson RN on 12/11/2023 at 3:58 PM

## 2023-12-11 NOTE — DISCHARGE SUMMARY
V2.0    [facility] Progress Note      Name:  Mildred Espinosa /Age/Sex: 1960  (66 y.o. female)   MRN & CSN:  [id number] & [id number] Encounter Date/Time: 2023 7:39 AM EST   Location:  [address] [place] [address] PCP: Tawana Cary MD     Attending:Sarahi Aparicio MD       Hospital Day: 14    Assessment and Recommendations   Mildred Espinosa is a 66 y.o. female who presented with cough and shortness of breath. Plan:   Acute hypoxic respiratory failure due to pulmonary emboli:  CT chest with multiple acute right lower lobe pulmonary emboli and multifocal infiltrates. Multifocal infiltrates most likely pulmonary infarcts. Venous ultrasound with bilateral DVT. Started on Eliquis  Respiratory viral panel, urine strep and Legionella antigen negative. Blood cultures NGTD. Completed course of Rocephin and azithromycin. Heme consult appreciated; recommended outpatient follow-up. ID consulted: Recommended monitoring off antibiotics. Repeat CXR 12/10 with bibasilar opacities ? Atelectasis  Wean off supplemental oxygen as tolerated. Pulm consulted: Recommending MetaNebs every 4 hours, Mucinex and DuoNeb. Hypertension: Now with relative hypotension. Monitor off HCTZ. Seizures: Continue Depakote. Seizure precautions. Te-Moak-Gutierrez Syndrome     Patient stable for discharge to [facility] pending rental of lung therapy device/MetaNeb system to continue Namrata Santana as recommended. Diet ADULT DIET; Regular; Dislikes cooked carrots & coffee. Prefers hot chocolate & orange juice. DVT Prophylaxis Therapeutic Lovenox   Code Status Full Code   Disposition [facility] recommended         Personally reviewed Lab Studies and Imaging         Subjective:     Chief Complaint:  cough, shortness of breath    Patient seen and examined. Persistent cough ++, dyspnea, congested  No chest pain, fever or chills.     Currently requiring 1-2 L nasal cannula     Review of Systems:      Pertinent positives and negatives discussed in There is overlying EKG lead artifact. The heart is borderline enlarged. The pulmonary vessels are slightly engorged centrally. There is hazy interstitial and ground-glass opacity along the left perihilar region extending inferiorly. No effusion is seen. Mild cardiomegaly and mild central pulmonary congestion Overlying EKG lead artifact with a questionable early infiltrate vs atypical pulmonary edema along the left perihilar region and extending into the lung base. CBC:   Recent Labs     12/12/23  0510   WBC 5.7   HGB 11.7*          BMP:    Recent Labs     12/12/23  0510      K 4.0      CO2 33*   BUN 12   CREATININE 0.6   GLUCOSE 88       Hepatic:   Recent Labs     12/12/23  0510   AST 10*   ALT 15   BILITOT 0.4   ALKPHOS 62         Lipids: No results found for: \"CHOL\", \"HDL\", \"TRIG\"  Hemoglobin A1C: No results found for: \"LABA1C\"  TSH: No results found for: \"TSH\"  Troponin: No results found for: \"TROPONINT\"  Lactic Acid: No results for input(s): \"LACTA\" in the last 72 hours. BNP:   No results for input(s): \"PROBNP\" in the last 72 hours. UA:  Lab Results   Component Value Date/Time    NITRU Negative 11/29/2023 05:52 PM    COLORU Yellow 11/29/2023 05:52 PM    PHUR 7.5 11/29/2023 05:52 PM    WBCUA 7 11/29/2023 05:52 PM    RBCUA 3 11/29/2023 05:52 PM    BACTERIA 2+ 11/29/2023 05:52 PM    CLARITYU CLOUDY 11/29/2023 05:52 PM    SPECGRAV 1.025 11/29/2023 05:52 PM    LEUKOCYTESUR SMALL 11/29/2023 05:52 PM    UROBILINOGEN 1.0 11/29/2023 05:52 PM    BILIRUBINUR Negative 11/29/2023 05:52 PM    BLOODU Negative 11/29/2023 05:52 PM    GLUCOSEU Negative 11/29/2023 05:52 PM    KETUA TRACE 11/29/2023 05:52 PM     Urine Cultures: No results found for: \"LABURIN\"  Blood Cultures:   Lab Results   Component Value Date/Time    BC No Growth after 4 days of incubation. 11/29/2023 08:14 PM     Lab Results   Component Value Date/Time    BLOODCULT2 No Growth after 4 days of incubation.  11/29/2023 08:14 PM

## 2023-12-11 NOTE — PROGRESS NOTES
[facility] Department   Phone: [phone]    Physical Therapy    [] Initial Evaluation            [x] Daily Treatment Note         [] Discharge Summary      Patient: Marcelino Nelson   : 1960   MRN: [id number]   Date of Service:  2023  Admitting Diagnosis: Multifocal pneumonia  Current Admission Summary: This is a 66 y.o. female with pertinent past medical history of developmental disability, seizure disorder who was brought in by EMS transportation for cough ongoing for the past 6 days. Patient is nursing home resident, has been receiving NyQuil and DayQuil however today developed fever. Patient also reports sore throat and difficulty breathing. Past Medical History:  has a past medical history of Developmental disability, Eagle-Gutierrez syndrome, Hyperlipidemia, Hypertension, Seizure ([address]), Seizures ([address]), and Thyroid disease. Past Surgical History:  has a past surgical history that includes Thyroid surgery (). Discharge Recommendations: Marcelino Nelson scored a 15/24 on the AM-PAC short mobility form. Current research shows that an AM-PAC score of 17 or less is typically not associated with a discharge to the patient's home setting. Based on the patient's AM-PAC score and their current functional mobility deficits, it is recommended that the patient have 3-5 sessions per week of Physical Therapy at d/c to increase the patient's independence. Please see assessment section for further patient specific details. If patient discharges prior to next session this note will serve as a discharge summary. Please see below for the latest assessment towards goals.     DME Required For Discharge: DME to be determined at next level of care  Precautions/Restrictions: high fall risk, up as tolerated  Weight Bearing Restrictions: no restrictions     Required Braces/Orthotics: no braces required  Positional Restrictions:no positional restrictions    Pre-Admission date.  Comments:  Wheelchair Mobility:  No w/c mobility completed on this date. Comments:  Balance:  Static Sitting Balance: fair: maintains balance at CGA without use of UE support  Dynamic Sitting Balance: fair: maintains balance at CGA without use of UE support  Static Standing Balance: fair (-): maintains balance at CGA with use of UE support  Dynamic Standing Balance: poor (+): requires min (A) to maintain balance  Comments: RW standing and walking    Other Therapeutic Interventions  Seated Exercises: B LAQ, marching, ankle pumps 2x10    Functional Outcomes  AM-PAC Inpatient Mobility Raw Score : 15              Cognition  Overall Cognitive Status: Impaired  Arousal/Alertness: appropriate responses to stimuli  Following Commands: follows one step commands with repetition, follows one step commands with increased time  Attention Span: attends with cues to redirect, difficulty attending to directions, difficulty dividing attention  Safety Judgement: decreased awareness of need for assistance, decreased awareness of need for safety  Problem Solving: assistance required to generate solutions, assistance required to implement solutions, decreased awareness of errors, assistance required to identify errors made, assistance required to correct errors made  Insights: decreased awareness of deficits  Initiation: requires cues for some  Sequencing: requires cues for some  Comment: History of developmental disability.    Orientation:    alert and oriented x 4  Command Following:   accurately follows one step commands with increased time and repetition of instruction    Education  Barriers To Learning: cognition  Patient Education: patient educated on goals, PT role and benefits, plan of care, general safety, functional mobility training, energy conservation, transfer training  Learning Assessment:  patient will require reinforcement due to cognitive deficits    Assessment  Activity Tolerance: Fair; patient limited by

## 2023-12-11 NOTE — CONSULTS
PULMONARY AND CRITICAL CARE CONSULTATION NOTE    CONSULTING PHYSICIAN:      REASON FOR CONSULT:   Chief Complaint   Patient presents with    Cough     PT to ED via [address] [facility] from home c/o cough. PT is MRDD. History obtained with EMS/Caregiver help. Pt temp 101.2. PT has been getting nyquil and dayquil for symptoms. Symptoms began x6 days. Fever    Malaise       DATE OF CONSULT: 12/11/2023    HISTORY OF PRESENT ILLNESS: 66y.o. year old female with past medical history of MRDD, hypertension, hyperlipidemia, seizure disorder, Eagle Gutierrez syndrome who was admitted roughly a week ago for complaints of shortness of breath and cough with hypoxia. At that time, patient was diagnosed with multifocal pneumonia involving the left lung and acute right lower lobe PE. Patient has finished 7 days of Rocephin and 3 days of azithromycin. She is on 2 to 3 L nasal cannula and saturating well. She is afebrile. Patient has been having some persistent cough and shortness of breath for which pulmonary was consulted. Pro-Aaron obtained yesterday was normal.  Respiratory [place] panel with COVID and 12/10 negative. Patient states that her main complaint right now is cough which is sometimes dry and sometimes productive of white to yellow phlegm. I reviewed the chest x-ray from 12/10/2023 and my interpretation is as follows. Mild improvement in multifocal infiltrates. I reviewed the CT chest from 12/5/2023 and my interpretation is as follows. Left lower lobe consolidation with left upper lobe infiltrate. REVIEW OF SYSTEMS:   CONSTITUTIONAL SYMPTOMS: The patient denies fever, fatigue, night sweats, weight loss or weight gain. HEENT: No vision changes. No tinnitus, Denies sinus pain. No hoarseness, or dysphagia. NECK: Patient denies swelling in the neck. CARDIOVASCULAR: Denies chest pain, palpitation, syncope. RESPIRATORY: See above.    GASTROINTESTINAL: Denies nausea, abdominal pain or change in bowel

## 2023-12-11 NOTE — PLAN OF CARE
Problem: Discharge Planning  Goal: Discharge to home or other facility with appropriate resources  12/11/2023 1435 by Michael Cunha RN  Outcome: Progressing  12/11/2023 0201 by Shu Samuels RN  Outcome: Progressing     Problem: Pain  Goal: Verbalizes/displays adequate comfort level or baseline comfort level  12/11/2023 1435 by Michael Cunha RN  Outcome: Progressing  12/11/2023 0201 by Shu Samuels RN  Outcome: Progressing     Problem: Skin/Tissue Integrity  Goal: Absence of new skin breakdown  Description: 1. Monitor for areas of redness and/or skin breakdown  2. Assess vascular access sites hourly  3. Every 4-6 hours minimum:  Change oxygen saturation probe site  4. Every 4-6 hours:  If on nasal continuous positive airway pressure, respiratory therapy assess nares and determine need for appliance change or resting period.   Outcome: Progressing     Problem: Safety - Adult  Goal: Free from fall injury  12/11/2023 1435 by Michael Cunha RN  Outcome: Progressing  12/11/2023 0201 by Shu Samuels RN  Outcome: Progressing     Problem: ABCDS Injury Assessment  Goal: Absence of physical injury  Outcome: Progressing

## 2023-12-12 LAB
ALBUMIN SERPL-MCNC: 3.2 G/DL (ref 3.4–5)
ALBUMIN/GLOB SERPL: 1.2 {RATIO} (ref 1.1–2.2)
ALP SERPL-CCNC: 62 U/L (ref 40–129)
ALT SERPL-CCNC: 15 U/L (ref 10–40)
ANION GAP SERPL CALCULATED.3IONS-SCNC: 2 MMOL/L (ref 3–16)
AST SERPL-CCNC: 10 U/L (ref 15–37)
BASOPHILS # BLD: 0 K/UL (ref 0–0.2)
BASOPHILS NFR BLD: 0.7 %
BILIRUB SERPL-MCNC: 0.4 MG/DL (ref 0–1)
BUN SERPL-MCNC: 12 MG/DL (ref 7–20)
CALCIUM SERPL-MCNC: 9.5 MG/DL (ref 8.3–10.6)
CHLORIDE SERPL-SCNC: 103 MMOL/L (ref 99–110)
CO2 SERPL-SCNC: 33 MMOL/L (ref 21–32)
CREAT SERPL-MCNC: 0.6 MG/DL (ref 0.6–1.2)
DEPRECATED RDW RBC AUTO: 15.1 % (ref 12.4–15.4)
EOSINOPHIL # BLD: 0.1 K/UL (ref 0–0.6)
EOSINOPHIL NFR BLD: 2.2 %
GFR SERPLBLD CREATININE-BSD FMLA CKD-EPI: >60 ML/MIN/{1.73_M2}
GLUCOSE SERPL-MCNC: 88 MG/DL (ref 70–99)
HCT VFR BLD AUTO: 34.3 % (ref 36–48)
HGB BLD-MCNC: 11.7 G/DL (ref 12–16)
LYMPHOCYTES # BLD: 2.2 K/UL (ref 1–5.1)
LYMPHOCYTES NFR BLD: 38.7 %
MAGNESIUM SERPL-MCNC: 1.8 MG/DL (ref 1.8–2.4)
MCH RBC QN AUTO: 32.7 PG (ref 26–34)
MCHC RBC AUTO-ENTMCNC: 34 G/DL (ref 31–36)
MCV RBC AUTO: 96.1 FL (ref 80–100)
MONOCYTES # BLD: 0.6 K/UL (ref 0–1.3)
MONOCYTES NFR BLD: 10.5 %
NEUTROPHILS # BLD: 2.7 K/UL (ref 1.7–7.7)
NEUTROPHILS NFR BLD: 47.9 %
PHOSPHATE SERPL-MCNC: 3.6 MG/DL (ref 2.5–4.9)
PLATELET # BLD AUTO: 211 K/UL (ref 135–450)
PMV BLD AUTO: 8.1 FL (ref 5–10.5)
POTASSIUM SERPL-SCNC: 4 MMOL/L (ref 3.5–5.1)
PROT SERPL-MCNC: 5.9 G/DL (ref 6.4–8.2)
RBC # BLD AUTO: 3.57 M/UL (ref 4–5.2)
SODIUM SERPL-SCNC: 138 MMOL/L (ref 136–145)
WBC # BLD AUTO: 5.7 K/UL (ref 4–11)

## 2023-12-12 PROCEDURE — 6370000000 HC RX 637 (ALT 250 FOR IP): Performed by: INTERNAL MEDICINE

## 2023-12-12 PROCEDURE — 94640 AIRWAY INHALATION TREATMENT: CPT

## 2023-12-12 PROCEDURE — 6370000000 HC RX 637 (ALT 250 FOR IP): Performed by: HOSPITALIST

## 2023-12-12 PROCEDURE — 85025 COMPLETE CBC W/AUTO DIFF WBC: CPT

## 2023-12-12 PROCEDURE — 83735 ASSAY OF MAGNESIUM: CPT

## 2023-12-12 PROCEDURE — 6360000002 HC RX W HCPCS: Performed by: INTERNAL MEDICINE

## 2023-12-12 PROCEDURE — 2700000000 HC OXYGEN THERAPY PER DAY

## 2023-12-12 PROCEDURE — 99233 SBSQ HOSP IP/OBS HIGH 50: CPT | Performed by: INTERNAL MEDICINE

## 2023-12-12 PROCEDURE — 2580000003 HC RX 258: Performed by: NURSE PRACTITIONER

## 2023-12-12 PROCEDURE — 80053 COMPREHEN METABOLIC PANEL: CPT

## 2023-12-12 PROCEDURE — 6370000000 HC RX 637 (ALT 250 FOR IP): Performed by: NURSE PRACTITIONER

## 2023-12-12 PROCEDURE — 84100 ASSAY OF PHOSPHORUS: CPT

## 2023-12-12 PROCEDURE — 94761 N-INVAS EAR/PLS OXIMETRY MLT: CPT

## 2023-12-12 PROCEDURE — 94669 MECHANICAL CHEST WALL OSCILL: CPT

## 2023-12-12 PROCEDURE — 1200000000 HC SEMI PRIVATE

## 2023-12-12 RX ORDER — GUAIFENESIN 600 MG/1
600 TABLET, EXTENDED RELEASE ORAL 2 TIMES DAILY
Qty: 40 TABLET | Refills: 0
Start: 2023-12-12 | End: 2024-01-01

## 2023-12-12 RX ORDER — ALBUTEROL SULFATE 2.5 MG/3ML
2.5 SOLUTION RESPIRATORY (INHALATION) EVERY 6 HOURS PRN
Qty: 120 EACH | Refills: 3
Start: 2023-12-12

## 2023-12-12 RX ORDER — ALBUTEROL SULFATE 2.5 MG/3ML
2.5 SOLUTION RESPIRATORY (INHALATION)
Qty: 120 EACH | Refills: 3
Start: 2023-12-12

## 2023-12-12 RX ADMIN — GUAIFENESIN 600 MG: 600 TABLET, EXTENDED RELEASE ORAL at 21:28

## 2023-12-12 RX ADMIN — ALBUTEROL SULFATE 2.5 MG: 2.5 SOLUTION RESPIRATORY (INHALATION) at 21:40

## 2023-12-12 RX ADMIN — LAMOTRIGINE 100 MG: 100 TABLET ORAL at 09:05

## 2023-12-12 RX ADMIN — LEVOTHYROXINE SODIUM 88 MCG: 0.09 TABLET ORAL at 09:04

## 2023-12-12 RX ADMIN — OXYBUTYNIN CHLORIDE 5 MG: 5 TABLET ORAL at 09:05

## 2023-12-12 RX ADMIN — LAMOTRIGINE 100 MG: 100 TABLET ORAL at 21:29

## 2023-12-12 RX ADMIN — ALBUTEROL SULFATE 2.5 MG: 2.5 SOLUTION RESPIRATORY (INHALATION) at 12:15

## 2023-12-12 RX ADMIN — Medication 10 ML: at 21:29

## 2023-12-12 RX ADMIN — PRAVASTATIN SODIUM 40 MG: 40 TABLET ORAL at 21:29

## 2023-12-12 RX ADMIN — DIVALPROEX SODIUM 500 MG: 250 TABLET, DELAYED RELEASE ORAL at 09:06

## 2023-12-12 RX ADMIN — ALBUTEROL SULFATE 2.5 MG: 2.5 SOLUTION RESPIRATORY (INHALATION) at 15:57

## 2023-12-12 RX ADMIN — APIXABAN 10 MG: 5 TABLET, FILM COATED ORAL at 21:29

## 2023-12-12 RX ADMIN — CALCIUM 500 MG: 500 TABLET ORAL at 09:06

## 2023-12-12 RX ADMIN — Medication 10 ML: at 09:06

## 2023-12-12 RX ADMIN — APIXABAN 10 MG: 5 TABLET, FILM COATED ORAL at 09:05

## 2023-12-12 RX ADMIN — GUAIFENESIN 600 MG: 600 TABLET, EXTENDED RELEASE ORAL at 09:05

## 2023-12-12 RX ADMIN — ALBUTEROL SULFATE 2.5 MG: 2.5 SOLUTION RESPIRATORY (INHALATION) at 08:36

## 2023-12-12 RX ADMIN — DIVALPROEX SODIUM 500 MG: 250 TABLET, DELAYED RELEASE ORAL at 21:28

## 2023-12-12 NOTE — PROGRESS NOTES
Admit Date: 11/29/2023  Diet: ADULT DIET; Regular; Dislikes cooked carrots & coffee. Prefers hot chocolate & orange juice. CC: SOB    Interval history:   Overnight, there were no acute events. Patient's vitals remained stable. She was on 2L NC     Patient was seen this morning sitting up in a chair. She endorses that she is doing much better. Patient still has a cough and had a small bout when she was being examined. Patient denies fevers, chills, nausea, vomiting, chest pain, diarrhea, constipation, dysuria, urinary frequency or urgency. Plan:     -Continue Metaneb therapy along with Acapella + IS  -Anticipate d/c tomorrow to SNF    Assessment:   Juno Waterman is a 66 y.o. female with PMH of Eagle-Gutierrez Syndrome, HLD, HTN, Seizures who was admitted with multifocal PNA + Acute PE    Acute hypoxic respiratory failure due to pulmonary emboli:  CT chest with multiple acute right lower lobe pulmonary emboli and multifocal infiltrates. Multifocal infiltrates most likely pulmonary infarcts. Venous ultrasound with bilateral DVT. Started on Eliquis  Respiratory viral panel, urine strep and Legionella antigen negative. Blood cultures NGTD. Completed course of Rocephin and azithromycin. Heme consult appreciated; recommended outpatient follow-up. ID consulted: Recommended monitoring off antibiotics. Repeat CXR 12/10 with bibasilar opacities ? Atelectasis  Wean off supplemental oxygen as tolerated. Pulm consulted: Recommending MetaNebs every 4 hours, Mucinex and DuoNeb. Hypertension: Now with relative hypotension. Monitor off HCTZ. Seizures: Continue Depakote. Seizure precautions. Code Status: Full Code   FEN: ADULT DIET; Regular; Dislikes cooked carrots & coffee. Prefers hot chocolate & orange juice. PPX: Eliquis  DISPO: EVANGELINA Sanabria MD  12/12/2023,  4:26 PM    This note was likely completed using voice recognition technology and may contain unintended errors.

## 2023-12-12 NOTE — PLAN OF CARE
Problem: Discharge Planning  Goal: Discharge to home or other facility with appropriate resources  12/12/2023 0217 by Luis Hannon RN  Outcome: Progressing  12/11/2023 1435 by Jennifer Garcia RN  Outcome: Progressing  12/11/2023 1435 by Jennifer Garcia RN  Outcome: Progressing     Problem: Pain  Goal: Verbalizes/displays adequate comfort level or baseline comfort level  12/12/2023 0217 by Luis Hannon RN  Outcome: Progressing  12/11/2023 1435 by Jennifer Garcia RN  Outcome: Progressing  12/11/2023 1435 by Jennifer Garcia RN  Outcome: Progressing     Problem: Skin/Tissue Integrity  Goal: Absence of new skin breakdown  Description: 1. Monitor for areas of redness and/or skin breakdown  2. Assess vascular access sites hourly  3. Every 4-6 hours minimum:  Change oxygen saturation probe site  4. Every 4-6 hours:  If on nasal continuous positive airway pressure, respiratory therapy assess nares and determine need for appliance change or resting period.   12/11/2023 1435 by Jennifer Garcia RN  Outcome: Progressing  12/11/2023 1435 by Jennifer Garcia RN  Outcome: Progressing     Problem: Safety - Adult  Goal: Free from fall injury  12/12/2023 0217 by Luis Hannon RN  Outcome: Progressing  12/11/2023 1435 by Jennifer Garcia RN  Outcome: Progressing  12/11/2023 1435 by Jennifer Garcia RN  Outcome: Progressing     Problem: ABCDS Injury Assessment  Goal: Absence of physical injury  12/11/2023 1435 by Jennifer Garcia RN  Outcome: Progressing  12/11/2023 1435 by Jennifer Garcia RN  Outcome: Progressing

## 2023-12-12 NOTE — PROGRESS NOTES
V2.0    [facility] Progress Note      Name:  Karime Castillo /Age/Sex: 1960  (66 y.o. female)   MRN & CSN:  [id number] & [id number] Encounter Date/Time: 2023 7:39 AM EST   Location:  [address] [place], [place] [id number] PCP: Rosalinda Moreno MD     Attending:Bubba Aparicio MD       Hospital Day: 14    Assessment and Recommendations   Karime Castillo is a 66 y.o. female who presented with cough and shortness of breath. Plan:   Acute hypoxic respiratory failure due to pulmonary emboli:  CT chest with multiple acute right lower lobe pulmonary emboli and multifocal infiltrates. Multifocal infiltrates most likely pulmonary infarcts. Venous ultrasound with bilateral DVT. Started on Eliquis  Respiratory viral panel, urine strep and Legionella antigen negative. Blood cultures NGTD. Completed course of Rocephin and azithromycin. Heme consult appreciated; recommended outpatient follow-up. ID consulted: Recommended monitoring off antibiotics. Repeat CXR 12/10 with bibasilar opacities ? Atelectasis  Wean off supplemental oxygen as tolerated. Pulm consulted: Recommending MetaNebs every 4 hours, Mucinex and DuoNeb. Hypertension: Now with relative hypotension. Monitor off HCTZ. Seizures: Continue Depakote. Seizure precautions. Knik-Gutierrez Syndrome     Patient stable for discharge to [facility] pending rental of lung therapy device/MetaNeb system to continue Shaylee [place] as recommended. Diet ADULT DIET; Regular; Dislikes cooked carrots & coffee. Prefers hot chocolate & orange juice. DVT Prophylaxis Therapeutic Lovenox   Code Status Full Code   Disposition [facility] recommended         Personally reviewed Lab Studies and Imaging         Subjective:     Chief Complaint:  cough, shortness of breath    Patient seen and examined. Persistent cough ++, dyspnea, congested  No chest pain, fever or chills.     Currently requiring 1-2 L nasal cannula     Review of Systems:      Pertinent positives and negatives discussed in Organism: No results found for: \"ORG\"      Electronically signed by Ligia Duran MD on 12/12/2023 at 7:01 AM

## 2023-12-12 NOTE — CARE COORDINATION
Discharge Planning Note:      Need MetaNeb Tx plan for SNF. Frequency of Tx and for how long? Facility looking to rent the [place]-neb machine.      Electronically signed by Renee Cespedes RN on 12/12/2023 at 8:09 AM

## 2023-12-12 NOTE — PROGRESS NOTES
2300: Shift assessment completed. VSS. Medications given per MAR. Alert and Oriented x4, denies pain at this time 0/10. Bedside table and call light within reach, bed in lowest position. The care plan and education has been reviewed and mutually agreed upon with the patient.      Ramon Chiu RN

## 2023-12-12 NOTE — PROGRESS NOTES
12:02 PM  Shift assessment completed and charted. VSS. Standard safety measures in place. Bed and chair locked and in lowest position, call light within reach. SpO2 > 90% on 2L oxygen nasal cannula. Patient up to chair for lunch. Pt stable and denied needs when writer left room.

## 2023-12-12 NOTE — CARE COORDINATION
Discharge Planning Note:    CM spoke to Belle at [facility] who reported speaking to the respiratory provider regarding the [place]-Neb. Facility is unable to get the [place]-Neb unit as it is no longer available to nursing homes. Facility can get the [place]obika unit in place of the Medi-Neb unit, whit is, an RT will com to facility and train staff on use for pt specific Tx. Belle reported that if MD approves the Nasra, she can have it on site for pt admission tomorrow. Belle will await CM call back. CM sent message to Dr. Iraida Guillory regarding Nasra. Dr. Iraida Guillory approved use of Nasra in place of [place]-Neb. CM called and LVM for Belle at [facility] with update. Plan for DC to [facility] tomorrow. Electronically signed by Magda Rivas RN on 12/12/2023 at 3:18 PM      Update  Belle has confirmed that Nasra has been ordered and requested pt be discharged early, preferable to be at the SNF for staff training at 1pm. CM working to secure transport for morning via [address] [place] [address] time requested: Wed, 12/13/23 at 10:30am (Pending authorization and availability of transport provider)    Electronically signed by Magda Rivas RN on 12/12/2023 at 3:29 PM    Update    Transport confirmed for tomorrow morning    Transport Scheduled:  Time: [address] [facility] [phone]    CM updated Dr. Constantine Ortega on above information.     Electronically signed by Magda Rivas RN on 12/12/2023 at 3:32 PM

## 2023-12-12 NOTE — PLAN OF CARE
Problem: Discharge Planning  Goal: Discharge to home or other facility with appropriate resources  12/12/2023 1203 by Beto Cornell RN  Outcome: Adequate for Discharge  12/12/2023 0217 by Kim Ruelas RN  Outcome: Progressing     Problem: Pain  Goal: Verbalizes/displays adequate comfort level or baseline comfort level  12/12/2023 1203 by Beto Cornell RN  Outcome: Adequate for Discharge  12/12/2023 0217 by Kim Ruelas RN  Outcome: Progressing     Problem: Skin/Tissue Integrity  Goal: Absence of new skin breakdown  Description: 1. Monitor for areas of redness and/or skin breakdown  2. Assess vascular access sites hourly  3. Every 4-6 hours minimum:  Change oxygen saturation probe site  4. Every 4-6 hours:  If on nasal continuous positive airway pressure, respiratory therapy assess nares and determine need for appliance change or resting period.   Outcome: Adequate for Discharge     Problem: Safety - Adult  Goal: Free from fall injury  12/12/2023 1203 by Beto Cornell RN  Outcome: Adequate for Discharge  12/12/2023 0217 by Kim Ruelas RN  Outcome: Progressing     Problem: ABCDS Injury Assessment  Goal: Absence of physical injury  Outcome: Adequate for Discharge

## 2023-12-12 NOTE — CARE COORDINATION
12/12/23 1609   IMM Letter   IMM Letter given to Patient/Family/Significant other/Guardian/POA/by: Reviewed with pt, signed & copy provided   IMM Letter date given: 12/12/23   IMM Letter time given: 0008

## 2023-12-13 VITALS
TEMPERATURE: 98.4 F | SYSTOLIC BLOOD PRESSURE: 98 MMHG | BODY MASS INDEX: 38.14 KG/M2 | HEART RATE: 64 BPM | RESPIRATION RATE: 18 BRPM | DIASTOLIC BLOOD PRESSURE: 57 MMHG | HEIGHT: 62 IN | OXYGEN SATURATION: 93 % | WEIGHT: 207.23 LBS

## 2023-12-13 PROCEDURE — 6370000000 HC RX 637 (ALT 250 FOR IP): Performed by: HOSPITALIST

## 2023-12-13 PROCEDURE — 94761 N-INVAS EAR/PLS OXIMETRY MLT: CPT

## 2023-12-13 PROCEDURE — 94640 AIRWAY INHALATION TREATMENT: CPT

## 2023-12-13 PROCEDURE — 6370000000 HC RX 637 (ALT 250 FOR IP): Performed by: NURSE PRACTITIONER

## 2023-12-13 PROCEDURE — 6360000002 HC RX W HCPCS: Performed by: INTERNAL MEDICINE

## 2023-12-13 PROCEDURE — 94669 MECHANICAL CHEST WALL OSCILL: CPT

## 2023-12-13 PROCEDURE — 2700000000 HC OXYGEN THERAPY PER DAY

## 2023-12-13 PROCEDURE — 6370000000 HC RX 637 (ALT 250 FOR IP): Performed by: INTERNAL MEDICINE

## 2023-12-13 RX ADMIN — ALBUTEROL SULFATE 2.5 MG: 2.5 SOLUTION RESPIRATORY (INHALATION) at 08:20

## 2023-12-13 RX ADMIN — OXYBUTYNIN CHLORIDE 5 MG: 5 TABLET ORAL at 08:00

## 2023-12-13 RX ADMIN — DIVALPROEX SODIUM 500 MG: 250 TABLET, DELAYED RELEASE ORAL at 08:00

## 2023-12-13 RX ADMIN — LAMOTRIGINE 100 MG: 100 TABLET ORAL at 08:00

## 2023-12-13 RX ADMIN — LEVOTHYROXINE SODIUM 88 MCG: 0.09 TABLET ORAL at 08:00

## 2023-12-13 RX ADMIN — GUAIFENESIN 600 MG: 600 TABLET, EXTENDED RELEASE ORAL at 08:00

## 2023-12-13 RX ADMIN — CALCIUM 500 MG: 500 TABLET ORAL at 08:00

## 2023-12-13 RX ADMIN — APIXABAN 10 MG: 5 TABLET, FILM COATED ORAL at 07:59

## 2023-12-13 NOTE — PROGRESS NOTES
Spoke with RN from \"third floor\" states she is unable to transfer to receiving Rn. Left contact number for RN to call back for report. Transport ambulance now on floor.

## 2023-12-13 NOTE — DISCHARGE INSTRUCTIONS
Please follow up with your PCP and Hematology after discharge within 1 week  Please take your medications as prescribed on discharge  Please adhere to metaneb therapy every 4 hours for 2 weeks  Please adhere to Acapella and Incentive Spirometry every 4 hours while awake after you complete your 2 week Metaneb therapy

## 2023-12-13 NOTE — DISCHARGE INSTR - OTHER ORDERS
Pt resting awake in bed. 93.% sat on 3 liter. RT in to give trx. PO meds taken easily with water. Denies pain. Repositioned higher in bed. Pt ate 100 % of breakfast meal. Pt to D/C to SNF today. Pt is aware.

## 2023-12-13 NOTE — DISCHARGE SUMMARY
\"PROBNP\" in the last 72 hours. UA:  Lab Results   Component Value Date/Time    NITRU Negative 11/29/2023 05:52 PM    COLORU Yellow 11/29/2023 05:52 PM    PHUR 7.5 11/29/2023 05:52 PM    WBCUA 7 11/29/2023 05:52 PM    RBCUA 3 11/29/2023 05:52 PM    BACTERIA 2+ 11/29/2023 05:52 PM    CLARITYU CLOUDY 11/29/2023 05:52 PM    SPECGRAV 1.025 11/29/2023 05:52 PM    LEUKOCYTESUR SMALL 11/29/2023 05:52 PM    UROBILINOGEN 1.0 11/29/2023 05:52 PM    BILIRUBINUR Negative 11/29/2023 05:52 PM    BLOODU Negative 11/29/2023 05:52 PM    GLUCOSEU Negative 11/29/2023 05:52 PM    KETUA TRACE 11/29/2023 05:52 PM     Urine Cultures: No results found for: \"LABURIN\"  Blood Cultures:   Lab Results   Component Value Date/Time    BC No Growth after 4 days of incubation. 11/29/2023 08:14 PM     Lab Results   Component Value Date/Time    BLOODCULT2 No Growth after 4 days of incubation. 11/29/2023 08:14 PM     Organism: No results found for: \"ORG\"        Discharge Medications:        Medication List        START taking these medications      * albuterol (2.5 MG/3ML) 0.083% nebulizer solution  Commonly known as: PROVENTIL  Take 3 mLs by nebulization every 6 hours as needed for Wheezing     * albuterol (2.5 MG/3ML) 0.083% nebulizer solution  Commonly known as: PROVENTIL  Take 3 mLs by nebulization 4 times daily     * apixaban 5 MG Tabs tablet  Commonly known as: ELIQUIS  Take 2 tablets by mouth 2 times daily for 2 days     * apixaban 5 MG Tabs tablet  Commonly known as: ELIQUIS  Take 1 tablet by mouth 2 times daily  Start taking on: December 14, 2023     guaiFENesin 600 MG extended release tablet  Commonly known as: MUCINEX  Take 1 tablet by mouth 2 times daily for 20 days           * This list has 4 medication(s) that are the same as other medications prescribed for you. Read the directions carefully, and ask your doctor or other care provider to review them with you.                 CONTINUE taking these medications      calcium carbonate 500 MG Tabs tablet  Commonly known as: OSCAL     CERTAVITE/ANTIOXIDANTS PO     * divalproex 500 MG DR tablet  Commonly known as: DEPAKOTE     * divalproex 250 MG DR tablet  Commonly known as: DEPAKOTE     lamoTRIgine 100 MG tablet  Commonly known as: LAMICTAL     levothyroxine 100 MCG tablet  Commonly known as: SYNTHROID     oxybutynin 5 MG tablet  Commonly known as: DITROPAN     pravastatin 40 MG tablet  Commonly known as: PRAVACHOL           * This list has 2 medication(s) that are the same as other medications prescribed for you. Read the directions carefully, and ask your doctor or other care provider to review them with you.                 STOP taking these medications      hydroCHLOROthiazide 25 MG tablet  Commonly known as: HYDRODIURIL               Where to Get Your Medications        Information about where to get these medications is not yet available    Ask your nurse or doctor about these medications  albuterol (2.5 MG/3ML) 0.083% nebulizer solution  albuterol (2.5 MG/3ML) 0.083% nebulizer solution  apixaban 5 MG Tabs tablet  apixaban 5 MG Tabs tablet  guaiFENesin 600 MG extended release tablet        Time Spent Discharging patient 35 minutes    Electronically signed by Felipe Barnes MD on 12/13/2023 at 12:46 PM

## 2023-12-13 NOTE — PROGRESS NOTES
Attempted x 2to call report to [facility]. Reception transferred to Nurse manager [facility] answering service. Message left with contact number to receive report.

## 2023-12-13 NOTE — PROGRESS NOTES
On third attempt contacted Annamarie ROUSSEAU and gave full report. All questions answered. Contact number provided in case additional questions arise.

## 2023-12-13 NOTE — PROGRESS NOTES
Pt resting awake in bed. Denies pain at this time. Ate 100% of breakfast. Po meds taken easily with water. Remains at 93% on 3 liters o2. Pt is axox4 and able to answer questions and follow commands throughout assessment. Call light in easy reach.

## 2023-12-13 NOTE — CARE COORDINATION
Discharge Plan:     Patient discharged to: [address] [place], [address]    SW/DC [address] [facility] faxed, [address] to: [phone]    Narcotic Prescriptions faxed were: N/A    RN:  will call report to:  [phone]    Medical Transport with: [facility] [phone]     time: 56    Family advised of discharge?: patient will notify the family    HENS Submitted?:  yes    All discharge needs met per case management.     MARTÍNEZ HendersonN RN    [facility]  Phone: [phone] Pt verbalized good understanding of pre op teaching